# Patient Record
Sex: MALE | Race: BLACK OR AFRICAN AMERICAN | Employment: FULL TIME | URBAN - METROPOLITAN AREA
[De-identification: names, ages, dates, MRNs, and addresses within clinical notes are randomized per-mention and may not be internally consistent; named-entity substitution may affect disease eponyms.]

---

## 2020-08-05 ENCOUNTER — HOSPITAL ENCOUNTER (OUTPATIENT)
Dept: WOUND CARE | Age: 65
Discharge: HOME | End: 2020-08-05
Payer: COMMERCIAL

## 2020-08-05 VITALS
SYSTOLIC BLOOD PRESSURE: 155 MMHG | WEIGHT: 315 LBS | BODY MASS INDEX: 60.69 KG/M2 | HEART RATE: 109 BPM | TEMPERATURE: 97.5 F | DIASTOLIC BLOOD PRESSURE: 85 MMHG

## 2020-08-05 PROCEDURE — 11042 DBRDMT SUBQ TIS 1ST 20SQCM/<: CPT | Performed by: SURGERY

## 2020-08-05 PROCEDURE — 99213 OFFICE O/P EST LOW 20 MIN: CPT

## 2020-08-05 PROCEDURE — 11042 DBRDMT SUBQ TIS 1ST 20SQCM/<: CPT

## 2020-08-05 RX ORDER — LIDOCAINE HYDROCHLORIDE 40 MG/ML
SOLUTION TOPICAL ONCE
Status: CANCELLED | OUTPATIENT
Start: 2020-08-05

## 2020-08-05 RX ORDER — BETAMETHASONE DIPROPIONATE 0.05 %
OINTMENT (GRAM) TOPICAL ONCE
Status: CANCELLED | OUTPATIENT
Start: 2020-08-05

## 2020-08-05 RX ORDER — BACITRACIN, NEOMYCIN, POLYMYXIN B 400; 3.5; 5 [USP'U]/G; MG/G; [USP'U]/G
OINTMENT TOPICAL ONCE
Status: CANCELLED | OUTPATIENT
Start: 2020-08-05

## 2020-08-05 RX ORDER — CLOBETASOL PROPIONATE 0.5 MG/G
OINTMENT TOPICAL ONCE
Status: CANCELLED | OUTPATIENT
Start: 2020-08-05

## 2020-08-05 RX ORDER — GENTAMICIN SULFATE 1 MG/G
OINTMENT TOPICAL ONCE
Status: CANCELLED | OUTPATIENT
Start: 2020-08-05

## 2020-08-05 RX ORDER — LIDOCAINE 50 MG/G
OINTMENT TOPICAL ONCE
Status: CANCELLED | OUTPATIENT
Start: 2020-08-05

## 2020-08-05 RX ORDER — BACITRACIN ZINC AND POLYMYXIN B SULFATE 500; 1000 [USP'U]/G; [USP'U]/G
OINTMENT TOPICAL ONCE
Status: CANCELLED | OUTPATIENT
Start: 2020-08-05

## 2020-08-05 RX ORDER — LIDOCAINE 40 MG/G
CREAM TOPICAL ONCE
Status: DISCONTINUED | OUTPATIENT
Start: 2020-08-05 | End: 2020-08-06 | Stop reason: HOSPADM

## 2020-08-05 RX ORDER — GINSENG 100 MG
CAPSULE ORAL ONCE
Status: CANCELLED | OUTPATIENT
Start: 2020-08-05

## 2020-08-05 RX ORDER — LIDOCAINE 40 MG/G
CREAM TOPICAL ONCE
Status: CANCELLED | OUTPATIENT
Start: 2020-08-05

## 2020-08-05 NOTE — PROGRESS NOTES
[address] [facility],[address] Floor:      [address] f: [phone] f: [phone] p: [phone] [email or web address]     Ordering Center: Mert [address]  Francia Her [place] [address]  [phone]  Dept: [phone]   Fax# [phone]    Patient Information:      [address] Ximena Burnis Bare   [phone]   : 1955  AGE: 60 y.o.      GENDER: male   TODAYS DATE:  2020    Insurance:      See Attached      Patient Wound Information:     Additional ICD-10 Codes: W85.008    Patient Active Problem List   Diagnosis Code    Cellulitis of scrotum N49.2    Lymphedema of both lower extremities I89.0    Ulcer of lower limb ([address] L97.909    Hypertension I10    Obesity, morbid ([address] E66.01    Cellulitis of leg, right L03.115    Cellulitis of right foot L03.115    Ulcer of right leg ([address] L97.919    Cellulitis of right leg L03.115    Chronic venous hypertension (idiopathic) with ulcer and inflammation of right lower extremity ([address] I87.331, L97.919       WOUNDS REQUIRING DRESSING SUPPLIES:     Wound 20 Leg Right;Medial #1 (Active)   Wound Image   20 0849   Wound Venous 20 0849   Wound Cleansed Rinsed/Irrigated with saline 20 0919   Wound Length (cm) 6 cm 20 0849   Wound Width (cm) 12 cm 20 0849   Wound Depth (cm) 0.1 cm 20 0849   Wound Surface Area (cm^2) 72 cm^2 20 0849   Wound Volume (cm^3) 7.2 cm^3 20 0849   Post-Procedure Length (cm) 6 cm 20 0919   Post-Procedure Width (cm) 12 cm 20 0919   Post-Procedure Depth (cm) 0.1 cm 20 0919   Post-Procedure Surface Area (cm^2) 72 cm^2 20   Post-Procedure Volume (cm^3) 7.2 cm^3 20   Wound Assessment Bleeding 20   Drainage Amount Moderate 20   Drainage Description Yellow 20 0849   Odor None 20   Alicia-wound Assessment Edema 20

## 2020-08-05 NOTE — PROGRESS NOTES
[address] Procedure Note    Sierra Pat  AGE: 60 y.o. GENDER: male    : 1955  TODAY'S DATE: 2020    Chief Complaint   Patient presents with    Wound Check     bilateral legs returning         History of Present Illness     Sierra Pat is a 60 y.o. male who presents today for wound evaluation. History of Wound: venous wound located on the right leg. Wound Pain:  mild  Severity:  2 / 10   Wound Type:  venous  Modifying Factors:  edema and venous stasis  Associated Signs/Symptoms:  edema    Procedure Note:     Performed by: Miguel Duque MD    Consent obtained: Yes    Time out taken: Yes    Pain Control: Anesthetic  Anesthetic: 4% Lidocaine Cream     Debridement: Excisional Debridement    Using curette the wound was sharply debrided    down through and including the removal of epidermis, dermis and subcutaneous tissue. Devitalized Tissue Debrided: fibrin, biofilm and slough    Pre Debridement Measurements:  Are located in the Wound Documentation Flow Sheet     Wound #: 1     Post  Debridement Measurements:  Wound 20 Leg Right;Medial #1 (Active)   Wound Image   20 0849   Wound Venous 20   Dressing/Treatment ABD; Ace wrap;Dry dressing;Non adherent 20 0937   Wound Cleansed Rinsed/Irrigated with saline 20 09   Wound Length (cm) 6 cm 20 08   Wound Width (cm) 12 cm 20 0849   Wound Depth (cm) 0.1 cm 20 08   Wound Surface Area (cm^2) 72 cm^2 20 08   Wound Volume (cm^3) 7.2 cm^3 20 0849   Post-Procedure Length (cm) 6 cm 20 0919   Post-Procedure Width (cm) 12 cm 20 0919   Post-Procedure Depth (cm) 0.1 cm 20 0919   Post-Procedure Surface Area (cm^2) 72 cm^2 20 0919   Post-Procedure Volume (cm^3) 7.2 cm^3 20 0919   Wound Assessment Bleeding 20 09   Drainage Amount Moderate 20   Drainage Description Yellow 20 0849   Odor None 20 08 Alicia-wound Assessment Edema 08/05/20 0849   [place]%Wound Bed 50 08/05/20 0849   Red%Wound Bed 0 08/05/20 0849   Yellow%Wound Bed 0 08/05/20 0849   Black%Wound Bed 0 08/05/20 0849   Purple%Wound Bed 0 08/05/20 0849   Other%Wound Bed 50 08/05/20 0849   Number of days: 0       Wound 08/05/20 Leg Right;Posterior #2 (Active)   Wound Image   08/05/20 0853   Wound Venous 08/05/20 0853   Dressing/Treatment ABD; Ace wrap;Dry dressing;Non adherent 08/05/20 0937   Wound Cleansed Rinsed/Irrigated with saline 08/05/20 0853   Wound Length (cm) 4.8 cm 08/05/20 0853   Wound Width (cm) 4.5 cm 08/05/20 0853   Wound Depth (cm) 0.1 cm 08/05/20 0853   Wound Surface Area (cm^2) 21.6 cm^2 08/05/20 0853   Wound Volume (cm^3) 2.16 cm^3 08/05/20 0853   Wound Assessment Pink; White 08/05/20 0853   Drainage Amount Moderate 08/05/20 0853   Drainage Description Yellow 08/05/20 0853   Odor None 08/05/20 0853   Alicia-wound Assessment Edema 08/05/20 0853   [place]%Wound Bed 20 08/05/20 0853   Red%Wound Bed 0 08/05/20 0853   Yellow%Wound Bed 0 08/05/20 0853   Black%Wound Bed 0 08/05/20 0853   Purple%Wound Bed 0 08/05/20 0853   Other%Wound Bed 80 08/05/20 0853   Number of days: 0       Total Surface Area Debrided: 10% of 72 sq cm wound, 7.2 sq cm    Bleeding:  Minimal    Hemostasis Achieved:  by pressure    Procedural Pain:  1  / 10     Post Procedural Pain:  2 / 10     Response to treatment:  Well tolerated by patient. Assessment:     Right venous stasis ulcer  Left venous stasis dermatitis    Patient tolerated procedure well and was given proper instruction. The nature of the patient's condition was explained in depth. The patient was informed that their compliance to the treatment plan is paramount to successful healing and prevention of further ulceration and/or infection       Plan:     Treatment Plan: With each dressing change, rinse wounds with 0.9% Saline. (May use wound wash or soft contact solution.  Both can be purchased at a local drug store). If unable to obtain saline, may use a gentle soap and water. Dressing care: Adaptic, dry dressing, 6\" ACE (from toes to knee)- change every other day. 6\" ACE (from toes to knee) to Left lower leg    Fawad Harris MD, FACS  8/5/2020  1:47 PM

## 2020-08-05 NOTE — PLAN OF CARE
Discharge instructions given. Patient verbalized understanding. Return to [facility] in 1 week.   Called/faxed orders to [facility]  Referral to Dr Vito Bustamante

## 2020-08-12 ENCOUNTER — HOSPITAL ENCOUNTER (OUTPATIENT)
Dept: WOUND CARE | Age: 65
Discharge: HOME | End: 2020-08-12
Payer: COMMERCIAL

## 2020-08-12 PROCEDURE — 11042 DBRDMT SUBQ TIS 1ST 20SQCM/<: CPT | Performed by: SURGERY

## 2020-08-12 PROCEDURE — 11042 DBRDMT SUBQ TIS 1ST 20SQCM/<: CPT

## 2020-08-12 RX ORDER — LIDOCAINE HYDROCHLORIDE 40 MG/ML
SOLUTION TOPICAL ONCE
Status: DISCONTINUED | OUTPATIENT
Start: 2020-08-12 | End: 2020-08-13 | Stop reason: HOSPADM

## 2020-08-12 RX ORDER — LIDOCAINE 40 MG/G
CREAM TOPICAL ONCE
Status: CANCELLED | OUTPATIENT
Start: 2020-08-12

## 2020-08-12 RX ORDER — CLOBETASOL PROPIONATE 0.5 MG/G
OINTMENT TOPICAL ONCE
Status: CANCELLED | OUTPATIENT
Start: 2020-08-12

## 2020-08-12 RX ORDER — BACITRACIN ZINC AND POLYMYXIN B SULFATE 500; 1000 [USP'U]/G; [USP'U]/G
OINTMENT TOPICAL ONCE
Status: DISCONTINUED | OUTPATIENT
Start: 2020-08-12 | End: 2020-08-13 | Stop reason: HOSPADM

## 2020-08-12 RX ORDER — LIDOCAINE 50 MG/G
OINTMENT TOPICAL ONCE
Status: CANCELLED | OUTPATIENT
Start: 2020-08-12

## 2020-08-12 RX ORDER — LIDOCAINE HYDROCHLORIDE 40 MG/ML
SOLUTION TOPICAL ONCE
Status: CANCELLED | OUTPATIENT
Start: 2020-08-12

## 2020-08-12 RX ORDER — BETAMETHASONE DIPROPIONATE 0.05 %
OINTMENT (GRAM) TOPICAL ONCE
Status: CANCELLED | OUTPATIENT
Start: 2020-08-12

## 2020-08-12 RX ORDER — GINSENG 100 MG
CAPSULE ORAL ONCE
Status: CANCELLED | OUTPATIENT
Start: 2020-08-12

## 2020-08-12 RX ORDER — BACITRACIN ZINC AND POLYMYXIN B SULFATE 500; 1000 [USP'U]/G; [USP'U]/G
OINTMENT TOPICAL ONCE
Status: CANCELLED | OUTPATIENT
Start: 2020-08-12

## 2020-08-12 RX ORDER — BACITRACIN, NEOMYCIN, POLYMYXIN B 400; 3.5; 5 [USP'U]/G; MG/G; [USP'U]/G
OINTMENT TOPICAL ONCE
Status: CANCELLED | OUTPATIENT
Start: 2020-08-12

## 2020-08-12 RX ORDER — GENTAMICIN SULFATE 1 MG/G
OINTMENT TOPICAL ONCE
Status: CANCELLED | OUTPATIENT
Start: 2020-08-12

## 2020-08-12 NOTE — PLAN OF CARE
Discharge instructions given. Patient verbalized understanding. Return to [address] [place] [address],[address] in 1 week.   Pt's insurance does not cover wound care supplies

## 2020-08-12 NOTE — PROGRESS NOTES
Drainage Description Serosanguinous;Green 08/12/20 0953   Odor None 08/12/20 0953   Alicia-wound Assessment Edema 08/12/20 0953   [place]%Wound Bed 100 08/12/20 0953   Red%Wound Bed 0 08/12/20 0953   Yellow%Wound Bed 0 08/12/20 0953   Black%Wound Bed 0 08/12/20 0953   Purple%Wound Bed 0 08/12/20 0953   Other%Wound Bed 0 08/12/20 0953   Number of days: 7       Wound 08/05/20 Leg Posterior; Left #2 (Active)   Wound Image   08/05/20 0853   Wound Venous 08/12/20 0953   Dressing/Treatment ABD; Ace wrap;Dry dressing;Non adherent 08/05/20 0937   Wound Cleansed Rinsed/Irrigated with saline 08/12/20 1023   Wound Length (cm) 1.7 cm 08/12/20 0953   Wound Width (cm) 1 cm 08/12/20 0953   Wound Depth (cm) 0.1 cm 08/12/20 0953   Wound Surface Area (cm^2) 1.7 cm^2 08/12/20 0953   Change in Wound Size % (l*w) 92.13 08/12/20 0953   Wound Volume (cm^3) 0.17 cm^3 08/12/20 0953   Wound Healing % 92 08/12/20 0953   Post-Procedure Length (cm) 1.7 cm 08/12/20 1023   Post-Procedure Width (cm) 1 cm 08/12/20 1023   Post-Procedure Depth (cm) 0.1 cm 08/12/20 1023   Post-Procedure Surface Area (cm^2) 1.7 cm^2 08/12/20 1023   Post-Procedure Volume (cm^3) 0.17 cm^3 08/12/20 1023   Wound Assessment Bleeding 08/12/20 1023   Drainage Amount Moderate 08/12/20 1023   Drainage Description Sanguinous; Yellow 08/12/20 0953   Odor None 08/12/20 0953   Alicia-wound Assessment Edema;Dry 08/12/20 0953   [place]%Wound Bed 60 08/12/20 0953   Red%Wound Bed 0 08/12/20 0953   Yellow%Wound Bed 20 08/12/20 0953   Black%Wound Bed 0 08/12/20 0953   Purple%Wound Bed 0 08/12/20 0953   Other%Wound Bed 0 08/12/20 0953   Number of days: 7       Total Surface Area Debrided:  10% of wound, debrided 7.3 sq cm     Bleeding:  Minimal    Hemostasis Achieved:  by pressure    Procedural Pain:  1  / 10     Post Procedural Pain:  1 / 10     Response to treatment:  Well tolerated by patient. Assessment:     Wound looks improved.   (improved, worse or stable)    Patient tolerated procedure well and was given proper instruction. The nature of the patient's condition was explained in depth. The patient was informed that their compliance to the treatment plan is paramount to successful healing and prevention of further ulceration and/or infection       Plan:     Treatment Plan: With each dressing change, rinse wounds with 0.9% Saline. (May use wound wash or soft contact solution. Both can be purchased at a local drug store). If unable to obtain saline, may use a gentle soap and water. Dressing care: Right lower leg, Left lower leg- Antibiotic ointment, dry dressing, 6\" ACE (from toes to knee)- change every other day. William Jo[address]  Gladis Juárez MD, FACS  8/12/2020  2:13 PM

## 2020-08-19 ENCOUNTER — HOSPITAL ENCOUNTER (OUTPATIENT)
Dept: WOUND CARE | Age: 65
Discharge: HOME | End: 2020-08-19
Payer: COMMERCIAL

## 2020-08-19 PROCEDURE — 11045 DBRDMT SUBQ TISS EACH ADDL: CPT | Performed by: SURGERY

## 2020-08-19 PROCEDURE — 11042 DBRDMT SUBQ TIS 1ST 20SQCM/<: CPT | Performed by: SURGERY

## 2020-08-19 PROCEDURE — 11042 DBRDMT SUBQ TIS 1ST 20SQCM/<: CPT

## 2020-08-19 PROCEDURE — 11045 DBRDMT SUBQ TISS EACH ADDL: CPT

## 2020-08-19 RX ORDER — LIDOCAINE HYDROCHLORIDE 40 MG/ML
SOLUTION TOPICAL ONCE
Status: CANCELLED | OUTPATIENT
Start: 2020-08-19

## 2020-08-19 RX ORDER — BACITRACIN, NEOMYCIN, POLYMYXIN B 400; 3.5; 5 [USP'U]/G; MG/G; [USP'U]/G
OINTMENT TOPICAL ONCE
Status: CANCELLED | OUTPATIENT
Start: 2020-08-19

## 2020-08-19 RX ORDER — LIDOCAINE 40 MG/G
CREAM TOPICAL ONCE
Status: CANCELLED | OUTPATIENT
Start: 2020-08-19

## 2020-08-19 RX ORDER — GINSENG 100 MG
CAPSULE ORAL ONCE
Status: CANCELLED | OUTPATIENT
Start: 2020-08-19

## 2020-08-19 RX ORDER — CLOBETASOL PROPIONATE 0.5 MG/G
OINTMENT TOPICAL ONCE
Status: CANCELLED | OUTPATIENT
Start: 2020-08-19

## 2020-08-19 RX ORDER — BACITRACIN ZINC AND POLYMYXIN B SULFATE 500; 1000 [USP'U]/G; [USP'U]/G
OINTMENT TOPICAL ONCE
Status: DISCONTINUED | OUTPATIENT
Start: 2020-08-19 | End: 2020-08-20 | Stop reason: HOSPADM

## 2020-08-19 RX ORDER — BACITRACIN ZINC AND POLYMYXIN B SULFATE 500; 1000 [USP'U]/G; [USP'U]/G
OINTMENT TOPICAL ONCE
Status: CANCELLED | OUTPATIENT
Start: 2020-08-19

## 2020-08-19 RX ORDER — GENTAMICIN SULFATE 1 MG/G
OINTMENT TOPICAL ONCE
Status: CANCELLED | OUTPATIENT
Start: 2020-08-19

## 2020-08-19 RX ORDER — BETAMETHASONE DIPROPIONATE 0.05 %
OINTMENT (GRAM) TOPICAL ONCE
Status: CANCELLED | OUTPATIENT
Start: 2020-08-19

## 2020-08-19 RX ORDER — LIDOCAINE 40 MG/G
CREAM TOPICAL ONCE
Status: DISCONTINUED | OUTPATIENT
Start: 2020-08-19 | End: 2020-08-20 | Stop reason: HOSPADM

## 2020-08-19 RX ORDER — LIDOCAINE 50 MG/G
OINTMENT TOPICAL ONCE
Status: CANCELLED | OUTPATIENT
Start: 2020-08-19

## 2020-08-19 NOTE — PROGRESS NOTES
[address] Procedure Note    David Singer  AGE: 60 y.o. GENDER: male    : 1955  TODAY'S DATE: 2020    Chief Complaint   Patient presents with    Wound Check     Light leg wound/ lymphedema        History of Present Illness     David Singer is a 60 y.o. male who presents today for wound evaluation. History of Wound: venous wound located on the both leg. Wound Pain:  mild  Severity:  2 / 10   Wound Type:  venous  Modifying Factors:  edema and venous stasis  Associated Signs/Symptoms:  edema    Procedure Note:     Performed by: Rosalina Garza MD    Consent obtained: Yes    Time out taken: Yes    Pain Control: Anesthetic  Anesthetic: 4% Lidocaine Cream     Debridement: Excisional Debridement    Using curette the wound was sharply debrided    down through and including the removal of epidermis, dermis and subcutaneous tissue. Devitalized Tissue Debrided: fibrin, biofilm and slough    Pre Debridement Measurements:  Are located in the Wound Documentation Flow Sheet     Wound #: 1 and 2     Post  Debridement Measurements:  Wound 20 Leg Right;Medial #1 (Active)   Wound Image   20 0849   Wound Venous 20 1034   Dressing/Treatment ABD; Ace wrap;Dry dressing;Non adherent 20 0937   Wound Cleansed Rinsed/Irrigated with saline 20 1041   Wound Length (cm) 4 cm 20 1034   Wound Width (cm) 10 cm 20 1034   Wound Depth (cm) 0.1 cm 20 1034   Wound Surface Area (cm^2) 40 cm^2 20 1034   Change in Wound Size % (l*w) 44.44 20 1034   Wound Volume (cm^3) 4 cm^3 20 1034   Wound Healing % 44 20 1034   Post-Procedure Length (cm) 4 cm 20 1041   Post-Procedure Width (cm) 10 cm 20 1041   Post-Procedure Depth (cm) 0.1 cm 20 1041   Post-Procedure Surface Area (cm^2) 40 cm^2 20 1041   Post-Procedure Volume (cm^3) 4 cm^3 20 1041   Wound Assessment Bleeding 20 1041   Drainage Amount Moderate 08/19/20 1041   Drainage Description Serous; Yellow 08/19/20 1034   Odor None 08/19/20 1034   Alicia-wound Assessment Maceration 08/19/20 1034   [place]%Wound Bed 50 08/19/20 1034   Red%Wound Bed 0 08/19/20 1034   Yellow%Wound Bed 0 08/19/20 1034   Black%Wound Bed 0 08/19/20 1034   Purple%Wound Bed 0 08/19/20 1034   Other%Wound Bed 50 08/19/20 1034   Number of days: 14       Wound 08/05/20 Leg Posterior; Left #2 (Active)   Wound Image   08/05/20 0853   Wound Venous 08/19/20 1034   Dressing/Treatment ABD; Ace wrap;Dry dressing;Non adherent 08/05/20 0937   Wound Cleansed Rinsed/Irrigated with saline 08/19/20 1041   Wound Length (cm) 1.5 cm 08/19/20 1034   Wound Width (cm) 3 cm 08/19/20 1034   Wound Depth (cm) 0.1 cm 08/19/20 1034   Wound Surface Area (cm^2) 4.5 cm^2 08/19/20 1034   Change in Wound Size % (l*w) 79.17 08/19/20 1034   Wound Volume (cm^3) 0.45 cm^3 08/19/20 1034   Wound Healing % 79 08/19/20 1034   Post-Procedure Length (cm) 1.5 cm 08/19/20 1041   Post-Procedure Width (cm) 3 cm 08/19/20 1041   Post-Procedure Depth (cm) 0.1 cm 08/19/20 1041   Post-Procedure Surface Area (cm^2) 4.5 cm^2 08/19/20 1041   Post-Procedure Volume (cm^3) 0.45 cm^3 08/19/20 1041   Wound Assessment Bleeding 08/19/20 1041   Drainage Amount Moderate 08/19/20 1041   Drainage Description Yellow;Serous 08/19/20 1034   Odor None 08/19/20 1034   Alicia-wound Assessment Edema 08/19/20 1034   [place]%Wound Bed 60 08/19/20 1034   Red%Wound Bed 0 08/19/20 1034   Yellow%Wound Bed 0 08/19/20 1034   Black%Wound Bed 0 08/19/20 1034   Purple%Wound Bed 0 08/19/20 1034   Other%Wound Bed 40 08/19/20 1034   Number of days: 14       Total Surface Area Debrided:  44.5 sq cm     Bleeding:  Minimal    Hemostasis Achieved:  by pressure    Procedural Pain:  1  / 10     Post Procedural Pain:  2 / 10     Response to treatment:  Well tolerated by patient. Assessment:     Wound looks stable.   (improved, worse or stable)    Patient tolerated procedure well and was given proper instruction. The nature of the patient's condition was explained in depth. The patient was informed that their compliance to the treatment plan is paramount to successful healing and prevention of further ulceration and/or infection       Plan:     Treatment Plan: With each dressing change, rinse wounds with 0.9% Saline. (May use wound wash or soft contact solution. Both can be purchased at a local drug store). If unable to obtain saline, may use a gentle soap and water. Dressing care: Right lower leg, Left lower leg- Antibiotic ointment, dry dressing, 6\" ACE (from toes to knee)- change every other day. William Jovel 6  Gladis Juárez MD, FACS  8/19/2020  10:54 AM

## 2020-08-19 NOTE — PLAN OF CARE
Discharge instructions given. Patient verbalized understanding. Return to [address] [place] [address],[address] in 1 week.   Called/faxed orders to [facility] for intermittent FMLA

## 2020-08-26 ENCOUNTER — OFFICE VISIT (OUTPATIENT)
Dept: VASCULAR SURGERY | Age: 65
End: 2020-08-26
Payer: COMMERCIAL

## 2020-08-26 ENCOUNTER — HOSPITAL ENCOUNTER (OUTPATIENT)
Dept: WOUND CARE | Age: 65
Discharge: HOME | End: 2020-08-26
Payer: COMMERCIAL

## 2020-08-26 VITALS — BODY MASS INDEX: 46.65 KG/M2 | HEIGHT: 69 IN | WEIGHT: 315 LBS

## 2020-08-26 PROCEDURE — G8417 CALC BMI ABV UP PARAM F/U: HCPCS | Performed by: SURGERY

## 2020-08-26 PROCEDURE — 99203 OFFICE O/P NEW LOW 30 MIN: CPT | Performed by: SURGERY

## 2020-08-26 PROCEDURE — 11042 DBRDMT SUBQ TIS 1ST 20SQCM/<: CPT | Performed by: SURGERY

## 2020-08-26 PROCEDURE — 11042 DBRDMT SUBQ TIS 1ST 20SQCM/<: CPT

## 2020-08-26 PROCEDURE — 1036F TOBACCO NON-USER: CPT | Performed by: SURGERY

## 2020-08-26 PROCEDURE — G8427 DOCREV CUR MEDS BY ELIG CLIN: HCPCS | Performed by: SURGERY

## 2020-08-26 PROCEDURE — 3017F COLORECTAL CA SCREEN DOC REV: CPT | Performed by: SURGERY

## 2020-08-26 RX ORDER — LIDOCAINE HYDROCHLORIDE 40 MG/ML
SOLUTION TOPICAL ONCE
Status: CANCELLED | OUTPATIENT
Start: 2020-08-26

## 2020-08-26 RX ORDER — LIDOCAINE 40 MG/G
CREAM TOPICAL ONCE
Status: CANCELLED | OUTPATIENT
Start: 2020-08-26

## 2020-08-26 RX ORDER — BACITRACIN ZINC AND POLYMYXIN B SULFATE 500; 1000 [USP'U]/G; [USP'U]/G
OINTMENT TOPICAL ONCE
Status: CANCELLED | OUTPATIENT
Start: 2020-08-26

## 2020-08-26 RX ORDER — LIDOCAINE 40 MG/G
CREAM TOPICAL ONCE
Status: DISCONTINUED | OUTPATIENT
Start: 2020-08-26 | End: 2020-08-27 | Stop reason: HOSPADM

## 2020-08-26 RX ORDER — GENTAMICIN SULFATE 1 MG/G
OINTMENT TOPICAL ONCE
Status: CANCELLED | OUTPATIENT
Start: 2020-08-26

## 2020-08-26 RX ORDER — BACITRACIN, NEOMYCIN, POLYMYXIN B 400; 3.5; 5 [USP'U]/G; MG/G; [USP'U]/G
OINTMENT TOPICAL ONCE
Status: CANCELLED | OUTPATIENT
Start: 2020-08-26

## 2020-08-26 RX ORDER — LIDOCAINE 50 MG/G
OINTMENT TOPICAL ONCE
Status: CANCELLED | OUTPATIENT
Start: 2020-08-26

## 2020-08-26 RX ORDER — CLOBETASOL PROPIONATE 0.5 MG/G
OINTMENT TOPICAL ONCE
Status: CANCELLED | OUTPATIENT
Start: 2020-08-26

## 2020-08-26 RX ORDER — BETAMETHASONE DIPROPIONATE 0.05 %
OINTMENT (GRAM) TOPICAL ONCE
Status: CANCELLED | OUTPATIENT
Start: 2020-08-26

## 2020-08-26 RX ORDER — GINSENG 100 MG
CAPSULE ORAL ONCE
Status: CANCELLED | OUTPATIENT
Start: 2020-08-26

## 2020-08-26 RX ORDER — BACITRACIN ZINC AND POLYMYXIN B SULFATE 500; 1000 [USP'U]/G; [USP'U]/G
OINTMENT TOPICAL ONCE
Status: DISCONTINUED | OUTPATIENT
Start: 2020-08-26 | End: 2020-08-27 | Stop reason: HOSPADM

## 2020-08-26 NOTE — PLAN OF CARE
Discharge instructions given. Patient verbalized understanding. Return to [facility] in 1 week. Has appt to see Dr Scott Matthews today  Instructed if the pain increases to go to the ED, [facility].

## 2020-08-26 NOTE — PROGRESS NOTES
Subjective:      Patient ID: Crescencio Tee is a 60 y.o. male. HPI Referral from Enrique Boudreaux MD through the [facility] for evaluation of treatment modalities for chronic leg ulcers. Currently being treated Circaids and dressing changes with weekly [facility] visits. Has no h/o VTE. Has noted weight gain (now BMI 62) and enlargement of B thighs (R>>L) with large thigh pannuses. Past Medical History:   Diagnosis Date    Asthma     Chronic pain     GI bleed     Hypertension     Pain, knee, right      Past Surgical History:   Procedure Laterality Date    KNEE SURGERY      right knee     Allergies   Allergen Reactions    Naproxen      GI Bleed    Omeprazole      Current Outpatient Medications   Medication Sig Dispense Refill    clindamycin (CLEOCIN) 300 MG capsule Take 300 mg by mouth daily      ciprofloxacin (CIPRO) 500 MG tablet Take 500 mg by mouth 2 times daily      silver sulfADIAZINE (SILVADENE) 1 % cream Apply to skin on right leg 25 g 0    famotidine (PEPCID AC) 10 MG tablet Take 10 mg by mouth 2 times daily.  UNKNOWN TO PATIENT Uses 2 inhalers daily, doesn't know what they are.  lisinopril (PRINIVIL;ZESTRIL) 20 MG tablet Take 20 mg by mouth daily.  cetirizine-psuedoephedrine (ZYRTEC-D) 5-120 MG per tablet Take 1 tablet by mouth 2 times daily.  naproxen (EC-NAPROSYN) 500 MG EC tablet Take 1 tablet by mouth every 12 hours as needed for Pain 20 tablet 3     No current facility-administered medications for this visit.       Facility-Administered Medications Ordered in Other Visits   Medication Dose Route Frequency Provider Last Rate Last Dose    lidocaine (LMX) 4 % cream   Topical Once Jeffery Harp MD        bacitracin-polymyxin b (POLYSPORIN) ointment   Topical Once Jeffery Harp MD         Social History     Socioeconomic History    Marital status:      Spouse name: Not on file    Number of children: Not on file    Years of education: Not on file    Highest education level: Neck:      Musculoskeletal: Normal range of motion and neck supple. Cardiovascular:      Rate and Rhythm: Normal rate and regular rhythm. Pulses: Normal pulses. Heart sounds: Normal heart sounds. Pulmonary:      Effort: Pulmonary effort is normal.      Breath sounds: Normal breath sounds. Abdominal:      General: Bowel sounds are normal.      Palpations: Abdomen is soft. Musculoskeletal:      Right lower leg: Edema (general firm) present. Left lower leg: Edema (less severe compared to R) present. Comments: Large R tight pannus, less severe L thigh pannus   Skin:     General: Skin is warm and dry. Capillary Refill: Capillary refill takes less than 2 seconds. Comments: Small wounds B gaitor regions   Neurological:      Mental Status: He is alert and oriented to person, place, and time. Cranial Nerves: No cranial nerve deficit. Psychiatric:         Mood and Affect: Mood normal.         Behavior: Behavior normal.         Thought Content: Thought content normal.         Judgment: Judgment normal.         Assessment:      Chronic secondary lymphedema B legs (R>L)  Venous ulcerations B legs - improved with [facility] treatment plan. Morbid obesity      Plan:      Continued wound care per [facility]. Recommend addition of B lymphapress device for BID to control thigh process. No indication for venous indication. Should be referred to bariatric surgeon for evaluation for surgical weight loss as adjuvant to edema control. Directed pt to return to [facility] for overall management.

## 2020-09-02 ENCOUNTER — HOSPITAL ENCOUNTER (OUTPATIENT)
Dept: WOUND CARE | Age: 65
Discharge: HOME | End: 2020-09-02
Payer: COMMERCIAL

## 2020-09-02 PROCEDURE — 11042 DBRDMT SUBQ TIS 1ST 20SQCM/<: CPT | Performed by: SURGERY

## 2020-09-02 PROCEDURE — 11042 DBRDMT SUBQ TIS 1ST 20SQCM/<: CPT

## 2020-09-02 RX ORDER — CLOBETASOL PROPIONATE 0.5 MG/G
OINTMENT TOPICAL ONCE
Status: CANCELLED | OUTPATIENT
Start: 2020-09-02

## 2020-09-02 RX ORDER — BETAMETHASONE DIPROPIONATE 0.05 %
OINTMENT (GRAM) TOPICAL ONCE
Status: CANCELLED | OUTPATIENT
Start: 2020-09-02

## 2020-09-02 RX ORDER — LIDOCAINE 50 MG/G
OINTMENT TOPICAL ONCE
Status: CANCELLED | OUTPATIENT
Start: 2020-09-02

## 2020-09-02 RX ORDER — GINSENG 100 MG
CAPSULE ORAL ONCE
Status: CANCELLED | OUTPATIENT
Start: 2020-09-02

## 2020-09-02 RX ORDER — BACITRACIN, NEOMYCIN, POLYMYXIN B 400; 3.5; 5 [USP'U]/G; MG/G; [USP'U]/G
OINTMENT TOPICAL ONCE
Status: CANCELLED | OUTPATIENT
Start: 2020-09-02

## 2020-09-02 RX ORDER — LIDOCAINE HYDROCHLORIDE 40 MG/ML
SOLUTION TOPICAL ONCE
Status: CANCELLED | OUTPATIENT
Start: 2020-09-02

## 2020-09-02 RX ORDER — BACITRACIN ZINC AND POLYMYXIN B SULFATE 500; 1000 [USP'U]/G; [USP'U]/G
OINTMENT TOPICAL ONCE
Status: CANCELLED | OUTPATIENT
Start: 2020-09-02

## 2020-09-02 RX ORDER — GENTAMICIN SULFATE 1 MG/G
OINTMENT TOPICAL ONCE
Status: CANCELLED | OUTPATIENT
Start: 2020-09-02

## 2020-09-02 RX ORDER — LIDOCAINE 40 MG/G
CREAM TOPICAL ONCE
Status: CANCELLED | OUTPATIENT
Start: 2020-09-02

## 2020-09-02 RX ORDER — LIDOCAINE 40 MG/G
CREAM TOPICAL ONCE
Status: DISCONTINUED | OUTPATIENT
Start: 2020-09-02 | End: 2020-09-03 | Stop reason: HOSPADM

## 2020-09-02 RX ORDER — BACITRACIN ZINC AND POLYMYXIN B SULFATE 500; 1000 [USP'U]/G; [USP'U]/G
OINTMENT TOPICAL ONCE
Status: DISCONTINUED | OUTPATIENT
Start: 2020-09-02 | End: 2020-09-03 | Stop reason: HOSPADM

## 2020-09-02 NOTE — PROGRESS NOTES
[address] [facility],[address]:      [address] f: [phone] f: [phone] p: [phone] [email or web address]     Ordering Center: Mert [address] [place] [address]  [phone]  Dept: [phone]   Fax# [phone]    Patient Information:      [address] [place] [address] Ximena [facility]   [phone]   : 1955  AGE: 60 y.o. GENDER: male   TODAYS DATE:  2020    Insurance:      PRIMARY INSURANCE:  Plan: UNITED HE[employer] - CHOICE PLU  Coverage: UNITED HEALTHCARE  Effective Date: 2016  [id number] - (Commercial)      SECONDARY INSURANCE:  Plan:   Coverage:   Effective Date:   @[employer]@    [unfilled]   [unfilled]     Patient Wound Information:     Additional ICD-10 Codes: F37.979, L97.822, I87.2    Patient Active Problem List   Diagnosis Code    Cellulitis of scrotum N49.2    Lymphedema of both lower extremities I89.0    Ulcer of lower limb ([address] L97.909    Hypertension I10    Obesity, morbid ([address] E66.01    Cellulitis of leg, right L03.115    Cellulitis of right foot L03.115    Ulcer of right leg ([address] L97.919    Cellulitis of right leg L03.115    Chronic venous hypertension (idiopathic) with ulcer and inflammation of right lower extremity ([address] I87.331, L97.919       WOUNDS REQUIRING DRESSING SUPPLIES:     Wound 20 Leg Right;Medial #1 (Active)   Wound Image   20 0849   Wound Venous 20 1102   Dressing/Treatment ABD; Ace wrap;Dry dressing;Non adherent 20 0937   Wound Cleansed Rinsed/Irrigated with saline 20 1122   Wound Length (cm) 0.3 cm 20 1102   Wound Width (cm) 1.5 cm 20 1102   Wound Depth (cm) 0.1 cm 20 1102   Wound Surface Area (cm^2) 0.45 cm^2 20 1102   Change in Wound Size % (l*w) 99.38 20 1102   Wound Volume (cm^3) 0.04 cm^3 20 1102   Wound Healing % 99 20 1102   Post-Procedure Length (cm) 0.3 cm 09/02/20 1122   Post-Procedure Width (cm) 1.5 cm 09/02/20 1122   Post-Procedure Depth (cm) 0.1 cm 09/02/20 1122   Post-Procedure Surface Area (cm^2) 0.45 cm^2 09/02/20 1122   Post-Procedure Volume (cm^3) 0.04 cm^3 09/02/20 1122   Wound Assessment Bleeding 09/02/20 1122   Drainage Amount Moderate 09/02/20 1122   Drainage Description Serosanguinous 09/02/20 1102   Odor None 08/26/20 1002   Alicia-wound Assessment Dry;Clean 09/02/20 1102   [place]%Wound Bed 100 09/02/20 1102   Red%Wound Bed 0 09/02/20 1102   Yellow%Wound Bed 0 09/02/20 1102   Black%Wound Bed 0 09/02/20 1102   Purple%Wound Bed 0 08/26/20 1002   Other%Wound Bed 0 08/26/20 1002   Number of days: 28       Wound 08/05/20 Leg Posterior; Left #2 (Active)   Wound Image   08/05/20 0853   Wound Venous 09/02/20 1102   Dressing/Treatment ABD; Ace wrap;Dry dressing;Non adherent 08/05/20 0937   Wound Cleansed Rinsed/Irrigated with saline 09/02/20 1122   Wound Length (cm) 1 cm 09/02/20 1102   Wound Width (cm) 1 cm 09/02/20 1102   Wound Depth (cm) 0.1 cm 09/02/20 1102   Wound Surface Area (cm^2) 1 cm^2 09/02/20 1102   Change in Wound Size % (l*w) 95.37 09/02/20 1102   Wound Volume (cm^3) 0.1 cm^3 09/02/20 1102   Wound Healing % 95 09/02/20 1102   Post-Procedure Length (cm) 1 cm 09/02/20 1122   Post-Procedure Width (cm) 1 cm 09/02/20 1122   Post-Procedure Depth (cm) 0.1 cm 09/02/20 1122   Post-Procedure Surface Area (cm^2) 1 cm^2 09/02/20 1122   Post-Procedure Volume (cm^3) 0.1 cm^3 09/02/20 1122   Wound Assessment Bleeding 09/02/20 1122   Drainage Amount Moderate 09/02/20 1122   Drainage Description Yellow;Serous 09/02/20 1102   Odor None 09/02/20 1102   Alicia-wound Assessment Edema 09/02/20 1102   [place]%Wound Bed 0 09/02/20 1102   Red%Wound Bed 100 09/02/20 1102   Yellow%Wound Bed 0 09/02/20 1102   Black%Wound Bed 0 09/02/20 1102   Purple%Wound Bed 0 09/02/20 1102   Other%Wound Bed 0 08/26/20 1002   Number of days: 28          Supplies Requested :      Bilateral below knee

## 2020-09-02 NOTE — PROGRESS NOTES
Wound Assessment Bleeding 09/02/20 1122   Drainage Amount Moderate 09/02/20 1122   Drainage Description Serosanguinous 09/02/20 1102   Odor None 08/26/20 1002   Alicia-wound Assessment Dry;Clean 09/02/20 1102   [place]%Wound Bed 100 09/02/20 1102   Red%Wound Bed 0 09/02/20 1102   Yellow%Wound Bed 0 09/02/20 1102   Black%Wound Bed 0 09/02/20 1102   Purple%Wound Bed 0 08/26/20 1002   Other%Wound Bed 0 08/26/20 1002   Number of days: 28       Wound 08/05/20 Leg Posterior; Left #2 (Active)   Wound Image   08/05/20 0853   Wound Venous 09/02/20 1102   Dressing/Treatment ABD; Ace wrap;Dry dressing;Non adherent 08/05/20 0937   Wound Cleansed Rinsed/Irrigated with saline 09/02/20 1122   Wound Length (cm) 1 cm 09/02/20 1102   Wound Width (cm) 1 cm 09/02/20 1102   Wound Depth (cm) 0.1 cm 09/02/20 1102   Wound Surface Area (cm^2) 1 cm^2 09/02/20 1102   Change in Wound Size % (l*w) 95.37 09/02/20 1102   Wound Volume (cm^3) 0.1 cm^3 09/02/20 1102   Wound Healing % 95 09/02/20 1102   Post-Procedure Length (cm) 1 cm 09/02/20 1122   Post-Procedure Width (cm) 1 cm 09/02/20 1122   Post-Procedure Depth (cm) 0.1 cm 09/02/20 1122   Post-Procedure Surface Area (cm^2) 1 cm^2 09/02/20 1122   Post-Procedure Volume (cm^3) 0.1 cm^3 09/02/20 1122   Wound Assessment Bleeding 09/02/20 1122   Drainage Amount Moderate 09/02/20 1122   Drainage Description Yellow;Serous 09/02/20 1102   Odor None 09/02/20 1102   Alicia-wound Assessment Edema 09/02/20 1102   [place]%Wound Bed 0 09/02/20 1102   Red%Wound Bed 100 09/02/20 1102   Yellow%Wound Bed 0 09/02/20 1102   Black%Wound Bed 0 09/02/20 1102   Purple%Wound Bed 0 09/02/20 1102   Other%Wound Bed 0 08/26/20 1002   Number of days: 28       Total Surface Area Debrided:  1.45 sq cm     Bleeding:  Minimal    Hemostasis Achieved:  by pressure    Procedural Pain:  2  / 10     Post Procedural Pain:  2 / 10     Response to treatment:  Well tolerated by patient. Assessment:     Wound looks improved.   (improved, worse or stable)    Patient tolerated procedure well and was given proper instruction. The nature of the patient's condition was explained in depth. The patient was informed that their compliance to the treatment plan is paramount to successful healing and prevention of further ulceration and/or infection       Plan:     Treatment Plan: With each dressing change, rinse wounds with 0.9% Saline. (May use wound wash or soft contact solution. Both can be purchased at a local drug store). If unable to obtain saline, may use a gentle soap and water. Dressing care: Right lower leg, Left lower leg- Antibiotic ointment, dry dressing, 6\" ACE (from toes to knee)- change every other day. Of your pain increases you need to go to the Emergency department    [address] [facility] [address].  Hermine Scheuermann MD, FACS  9/2/2020  12:46 PM

## 2020-09-09 ENCOUNTER — HOSPITAL ENCOUNTER (OUTPATIENT)
Dept: WOUND CARE | Age: 65
Discharge: HOME | End: 2020-09-09
Payer: COMMERCIAL

## 2020-09-09 VITALS — BODY MASS INDEX: 61.73 KG/M2 | WEIGHT: 315 LBS

## 2020-09-09 PROCEDURE — 11042 DBRDMT SUBQ TIS 1ST 20SQCM/<: CPT

## 2020-09-09 PROCEDURE — 11042 DBRDMT SUBQ TIS 1ST 20SQCM/<: CPT | Performed by: SURGERY

## 2020-09-09 RX ORDER — BACITRACIN ZINC AND POLYMYXIN B SULFATE 500; 1000 [USP'U]/G; [USP'U]/G
OINTMENT TOPICAL ONCE
Status: DISCONTINUED | OUTPATIENT
Start: 2020-09-09 | End: 2020-09-10 | Stop reason: HOSPADM

## 2020-09-09 RX ORDER — LIDOCAINE 50 MG/G
OINTMENT TOPICAL ONCE
Status: CANCELLED | OUTPATIENT
Start: 2020-09-09

## 2020-09-09 RX ORDER — LIDOCAINE 40 MG/G
CREAM TOPICAL ONCE
Status: CANCELLED | OUTPATIENT
Start: 2020-09-09

## 2020-09-09 RX ORDER — BETAMETHASONE DIPROPIONATE 0.05 %
OINTMENT (GRAM) TOPICAL ONCE
Status: CANCELLED | OUTPATIENT
Start: 2020-09-09

## 2020-09-09 RX ORDER — GENTAMICIN SULFATE 1 MG/G
OINTMENT TOPICAL ONCE
Status: CANCELLED | OUTPATIENT
Start: 2020-09-09

## 2020-09-09 RX ORDER — BACITRACIN, NEOMYCIN, POLYMYXIN B 400; 3.5; 5 [USP'U]/G; MG/G; [USP'U]/G
OINTMENT TOPICAL ONCE
Status: CANCELLED | OUTPATIENT
Start: 2020-09-09

## 2020-09-09 RX ORDER — BACITRACIN ZINC AND POLYMYXIN B SULFATE 500; 1000 [USP'U]/G; [USP'U]/G
OINTMENT TOPICAL ONCE
Status: CANCELLED | OUTPATIENT
Start: 2020-09-09

## 2020-09-09 RX ORDER — LIDOCAINE HYDROCHLORIDE 40 MG/ML
SOLUTION TOPICAL ONCE
Status: CANCELLED | OUTPATIENT
Start: 2020-09-09

## 2020-09-09 RX ORDER — CLOBETASOL PROPIONATE 0.5 MG/G
OINTMENT TOPICAL ONCE
Status: CANCELLED | OUTPATIENT
Start: 2020-09-09

## 2020-09-09 RX ORDER — GINSENG 100 MG
CAPSULE ORAL ONCE
Status: CANCELLED | OUTPATIENT
Start: 2020-09-09

## 2020-09-09 RX ORDER — LIDOCAINE 40 MG/G
CREAM TOPICAL ONCE
Status: DISCONTINUED | OUTPATIENT
Start: 2020-09-09 | End: 2020-09-10 | Stop reason: HOSPADM

## 2020-09-09 ASSESSMENT — PAIN DESCRIPTION - PROGRESSION: CLINICAL_PROGRESSION: NOT CHANGED

## 2020-09-09 ASSESSMENT — PAIN DESCRIPTION - FREQUENCY: FREQUENCY: CONTINUOUS

## 2020-09-09 ASSESSMENT — PAIN DESCRIPTION - ONSET: ONSET: ON-GOING

## 2020-09-09 ASSESSMENT — PAIN DESCRIPTION - ORIENTATION: ORIENTATION: LEFT

## 2020-09-09 ASSESSMENT — PAIN DESCRIPTION - DESCRIPTORS: DESCRIPTORS: ACHING

## 2020-09-09 ASSESSMENT — PAIN DESCRIPTION - LOCATION: LOCATION: LEG

## 2020-09-09 ASSESSMENT — PAIN SCALES - GENERAL: PAINLEVEL_OUTOF10: 10

## 2020-09-09 ASSESSMENT — PAIN DESCRIPTION - PAIN TYPE: TYPE: CHRONIC PAIN

## 2020-09-09 NOTE — PROGRESS NOTES
[address] Procedure Note    Bakari Burrell  AGE: 60 y.o. GENDER: male    : 1955  TODAY'S DATE: 2020    Chief Complaint   Patient presents with    Wound Check     Bilateral legs F/U        History of Present Illness     Bakari Burrell is a 60 y.o. male who presents today for wound evaluation. History of Wound: venous and lymphedema wound located on the both legs  Wound Pain:  mild  Severity:  2 / 10   Wound Type:  venous and lymphedema  Modifying Factors:  edema, venous stasis, lymphedema, decreased mobility and obesity  Associated Signs/Symptoms:  edema and drainage    Procedure Note:     Performed by: Nubia Bailey MD    Consent obtained: Yes    Time out taken: Yes    Pain Control: Anesthetic  Anesthetic: 4% Lidocaine Cream     Debridement: Excisional Debridement    Using curette the wound was sharply debrided    down through and including the removal of epidermis, dermis and subcutaneous tissue. Devitalized Tissue Debrided: fibrin, biofilm and slough    Pre Debridement Measurements:  Are located in the Wound Documentation Flow Sheet     Wound #: 1 and 2     Post  Debridement Measurements:  Wound 20 Leg Right;Medial #1 (Active)   Wound Image   20 0849   Wound Venous 20 1108   Dressing/Treatment ABD; Ace wrap;Dry dressing;Non adherent 20 0937   Wound Cleansed Rinsed/Irrigated with saline 20 1125   Wound Length (cm) 5 cm 20 1108   Wound Width (cm) 10.5 cm 20 1108   Wound Depth (cm) 0.1 cm 20 1108   Wound Surface Area (cm^2) 52.5 cm^2 20 1108   Change in Wound Size % (l*w) 27.08 20 1108   Wound Volume (cm^3) 5.25 cm^3 20 1108   Wound Healing % 27 20 1108   Post-Procedure Length (cm) 5 cm 20 1125   Post-Procedure Width (cm) 10.5 cm 20 1125   Post-Procedure Depth (cm) 0.1 cm 20 1125   Post-Procedure Surface Area (cm^2) 52.5 cm^2 20 1125   Post-Procedure Volume (cm^3) 5.25 cm^3 09/09/20 1125   Wound Assessment Bleeding 09/09/20 1125   Drainage Amount Moderate 09/09/20 1125   Drainage Description Yellow 09/09/20 1108   Odor None 09/09/20 1108   Alicia-wound Assessment Edema; Excoriated 09/09/20 1108   [place]%Wound Bed 50 09/09/20 1108   Red%Wound Bed 0 09/09/20 1108   Yellow%Wound Bed 0 09/09/20 1108   Black%Wound Bed 0 09/09/20 1108   Purple%Wound Bed 0 09/09/20 1108   Other%Wound Bed 50 09/09/20 1108   Number of days: 35       Wound 08/05/20 Leg Posterior; Left #2 (Active)   Wound Image   08/05/20 0853   Wound Venous 09/09/20 1108   Dressing/Treatment ABD; Ace wrap;Dry dressing;Non adherent 08/05/20 0937   Wound Cleansed Rinsed/Irrigated with saline 09/09/20 1125   Wound Length (cm) 1.4 cm 09/09/20 1108   Wound Width (cm) 3 cm 09/09/20 1108   Wound Depth (cm) 0.1 cm 09/09/20 1108   Wound Surface Area (cm^2) 4.2 cm^2 09/09/20 1108   Change in Wound Size % (l*w) 80.56 09/09/20 1108   Wound Volume (cm^3) 0.42 cm^3 09/09/20 1108   Wound Healing % 81 09/09/20 1108   Post-Procedure Length (cm) 1.4 cm 09/09/20 1125   Post-Procedure Width (cm) 3 cm 09/09/20 1125   Post-Procedure Depth (cm) 0.1 cm 09/09/20 1125   Post-Procedure Surface Area (cm^2) 4.2 cm^2 09/09/20 1125   Post-Procedure Volume (cm^3) 0.42 cm^3 09/09/20 1125   Wound Assessment Bleeding 09/09/20 1125   Drainage Amount Moderate 09/09/20 1125   Drainage Description Yellow 09/09/20 1108   Odor None 09/02/20 1102   Alicia-wound Assessment Edema; Excoriated 09/09/20 1108   [place]%Wound Bed 100 09/09/20 1108   Red%Wound Bed 0 09/09/20 1108   Yellow%Wound Bed 0 09/09/20 1108   Black%Wound Bed 0 09/09/20 1108   Purple%Wound Bed 0 09/09/20 1108   Other%Wound Bed 0 09/09/20 1108   Number of days: 35       Total Surface Area Debrided:  (10% of wound 1), 14.7 sq cm     Bleeding:  Minimal    Hemostasis Achieved:  by pressure    Procedural Pain:  1  / 10     Post Procedural Pain:  1 / 10     Response to treatment:  Well tolerated by patient. Assessment:     Wound looks improved. (improved, worse or stable)    Patient tolerated procedure well and was given proper instruction. The nature of the patient's condition was explained in depth. The patient was informed that their compliance to the treatment plan is paramount to successful healing and prevention of further ulceration and/or infection       Plan:     Treatment Plan: With each dressing change, rinse wounds with 0.9% Saline. (May use wound wash or soft contact solution. Both can be purchased at a local drug store). If unable to obtain saline, may use a gentle soap and water. Dressing care: Right lower leg, Left lower leg- Antibiotic ointment, dry dressing, compression stockings or a 6\" ACE (from toes to knee)- change every other day. Of your pain increases you need to go to the Emergency department    [address] [place] [address] [address].  Jay Medina MD, FACS  9/9/2020  1:45 PM

## 2020-09-09 NOTE — PLAN OF CARE
Discharge instructions given. Patient verbalized understanding. Return to [address] [place] [address],[address] in 1 week.

## 2020-09-16 ENCOUNTER — HOSPITAL ENCOUNTER (OUTPATIENT)
Dept: WOUND CARE | Age: 65
Discharge: HOME | End: 2020-09-16
Payer: COMMERCIAL

## 2020-09-16 VITALS — BODY MASS INDEX: 61.73 KG/M2 | WEIGHT: 315 LBS

## 2020-09-16 PROCEDURE — 11042 DBRDMT SUBQ TIS 1ST 20SQCM/<: CPT

## 2020-09-16 PROCEDURE — 11042 DBRDMT SUBQ TIS 1ST 20SQCM/<: CPT | Performed by: SURGERY

## 2020-09-16 RX ORDER — BACITRACIN ZINC AND POLYMYXIN B SULFATE 500; 1000 [USP'U]/G; [USP'U]/G
OINTMENT TOPICAL ONCE
Status: CANCELLED | OUTPATIENT
Start: 2020-09-16

## 2020-09-16 RX ORDER — GENTAMICIN SULFATE 1 MG/G
OINTMENT TOPICAL ONCE
Status: CANCELLED | OUTPATIENT
Start: 2020-09-16

## 2020-09-16 RX ORDER — CLOBETASOL PROPIONATE 0.5 MG/G
OINTMENT TOPICAL ONCE
Status: CANCELLED | OUTPATIENT
Start: 2020-09-16

## 2020-09-16 RX ORDER — GINSENG 100 MG
CAPSULE ORAL ONCE
Status: CANCELLED | OUTPATIENT
Start: 2020-09-16

## 2020-09-16 RX ORDER — BETAMETHASONE DIPROPIONATE 0.05 %
OINTMENT (GRAM) TOPICAL ONCE
Status: CANCELLED | OUTPATIENT
Start: 2020-09-16

## 2020-09-16 RX ORDER — LIDOCAINE 40 MG/G
CREAM TOPICAL ONCE
Status: CANCELLED | OUTPATIENT
Start: 2020-09-16

## 2020-09-16 RX ORDER — BACITRACIN, NEOMYCIN, POLYMYXIN B 400; 3.5; 5 [USP'U]/G; MG/G; [USP'U]/G
OINTMENT TOPICAL ONCE
Status: CANCELLED | OUTPATIENT
Start: 2020-09-16

## 2020-09-16 RX ORDER — LIDOCAINE HYDROCHLORIDE 40 MG/ML
SOLUTION TOPICAL ONCE
Status: CANCELLED | OUTPATIENT
Start: 2020-09-16

## 2020-09-16 RX ORDER — LIDOCAINE 40 MG/G
CREAM TOPICAL ONCE
Status: DISCONTINUED | OUTPATIENT
Start: 2020-09-16 | End: 2020-09-17 | Stop reason: HOSPADM

## 2020-09-16 RX ORDER — LIDOCAINE 50 MG/G
OINTMENT TOPICAL ONCE
Status: CANCELLED | OUTPATIENT
Start: 2020-09-16

## 2020-09-16 RX ORDER — BACITRACIN ZINC AND POLYMYXIN B SULFATE 500; 1000 [USP'U]/G; [USP'U]/G
OINTMENT TOPICAL ONCE
Status: DISCONTINUED | OUTPATIENT
Start: 2020-09-16 | End: 2020-09-17 | Stop reason: HOSPADM

## 2020-09-16 ASSESSMENT — PAIN DESCRIPTION - FREQUENCY: FREQUENCY: CONTINUOUS

## 2020-09-16 ASSESSMENT — PAIN DESCRIPTION - PROGRESSION: CLINICAL_PROGRESSION: NOT CHANGED

## 2020-09-16 ASSESSMENT — PAIN SCALES - GENERAL: PAINLEVEL_OUTOF10: 10

## 2020-09-16 ASSESSMENT — PAIN DESCRIPTION - ORIENTATION: ORIENTATION: LEFT;RIGHT

## 2020-09-16 ASSESSMENT — PAIN DESCRIPTION - LOCATION: LOCATION: LEG

## 2020-09-16 ASSESSMENT — PAIN DESCRIPTION - ONSET: ONSET: ON-GOING

## 2020-09-16 ASSESSMENT — PAIN DESCRIPTION - DESCRIPTORS: DESCRIPTORS: ACHING

## 2020-09-16 ASSESSMENT — PAIN DESCRIPTION - PAIN TYPE: TYPE: CHRONIC PAIN

## 2020-09-16 NOTE — PLAN OF CARE
Discharge instructions given. Patient verbalized understanding. Return to [facility] in 1 week.

## 2020-09-23 ENCOUNTER — HOSPITAL ENCOUNTER (OUTPATIENT)
Dept: WOUND CARE | Age: 65
Discharge: HOME | End: 2020-09-23
Payer: COMMERCIAL

## 2020-09-30 ENCOUNTER — HOSPITAL ENCOUNTER (OUTPATIENT)
Dept: WOUND CARE | Age: 65
Discharge: HOME | End: 2020-09-30
Payer: COMMERCIAL

## 2020-09-30 PROCEDURE — 99212 OFFICE O/P EST SF 10 MIN: CPT | Performed by: SURGERY

## 2020-09-30 PROCEDURE — 99212 OFFICE O/P EST SF 10 MIN: CPT

## 2020-09-30 NOTE — PLAN OF CARE
Discharge instructions given. Patient verbalized understanding.   Return to [address] [place] [address],[address] as needed  To get Lymphedema pumps through [facility]- approved by insurance

## 2020-09-30 NOTE — PROGRESS NOTES
[address] Progress Note    Brodie Prather  AGE: 60 y.o. GENDER: male    : 1955  TODAY'S DATE: 2020    Subjective:     Chief Complaint   Patient presents with    Wound Check     bilateral lower leg         History of Present Illness     Brodie Prather presents today for wound evaluation. History of Wound: 30-year-old male presents with lymphedema of both legs. Wounds have healed. No new complaints    Wound Type:  venous and lymphedema  Wound Location: both sides  leg  Wound Pain:  none  Severity:  1 / 10   Timing: resolved  Modifying Factors:  edema, venous stasis, lymphedema, decreased mobility and obesity  Associated Signs/Symptoms:  edema  Other significant symptoms or pertinent wound history: none. Past Medical History:   Diagnosis Date    Asthma     Chronic pain     GI bleed     Hypertension     Pain, knee, right        Past Surgical History:   Procedure Laterality Date    KNEE SURGERY      right knee       Current Outpatient Medications   Medication Sig Dispense Refill    naproxen (EC-NAPROSYN) 500 MG EC tablet Take 1 tablet by mouth every 12 hours as needed for Pain 20 tablet 3    clindamycin (CLEOCIN) 300 MG capsule Take 300 mg by mouth daily      ciprofloxacin (CIPRO) 500 MG tablet Take 500 mg by mouth 2 times daily      silver sulfADIAZINE (SILVADENE) 1 % cream Apply to skin on right leg 25 g 0    famotidine (PEPCID AC) 10 MG tablet Take 10 mg by mouth 2 times daily.  UNKNOWN TO PATIENT Uses 2 inhalers daily, doesn't know what they are.  lisinopril (PRINIVIL;ZESTRIL) 20 MG tablet Take 20 mg by mouth daily.  cetirizine-psuedoephedrine (ZYRTEC-D) 5-120 MG per tablet Take 1 tablet by mouth 2 times daily. No current facility-administered medications for this encounter.          Allergies   Allergen Reactions    Naproxen      GI Bleed    Omeprazole              REVIEW OF SYSTEMS    Pertinent items from the review of systems are discussed in the HPI; the remainder of the ROS was reviewed and is negative. Objective: There were no vitals taken for this visit. PHYSICAL EXAM    General Appearance: alert and oriented to person, place and time, well developed and well- nourished, in no acute distress  Skin: warm and dry, no rash or erythema  Head: normocephalic and atraumatic  Both legs with healed wounds, no new wounds or cellulitis      Assessment:     Patient Active Problem List   Diagnosis    Cellulitis of scrotum    Lymphedema of both lower extremities    Ulcer of lower limb ([address]    Hypertension    Obesity, morbid ([address]    Cellulitis of leg, right    Cellulitis of right foot    Ulcer of right leg (HCC)    Cellulitis of right leg    Chronic venous hypertension (idiopathic) with ulcer and inflammation of right lower extremity ([address]       Wound 08/05/20 Leg Right;Medial #1 (Active)   Wound Image   08/05/20 0849   Wound Venous 09/30/20 1035   Dressing/Treatment ABD; Ace wrap;Dry dressing;Non adherent 08/05/20 0937   Wound Cleansed Rinsed/Irrigated with saline 09/30/20 1035   Wound Length (cm) 0 cm 09/30/20 1035   Wound Width (cm) 0 cm 09/30/20 1035   Wound Depth (cm) 0.1 cm 09/30/20 1035   Wound Surface Area (cm^2) 0 cm^2 09/30/20 1035   Change in Wound Size % (l*w) 100 09/30/20 1035   Wound Volume (cm^3) 0 cm^3 09/30/20 1035   Wound Healing % 100 09/30/20 1035   Post-Procedure Length (cm) 1 cm 09/16/20 1135   Post-Procedure Width (cm) 0.3 cm 09/16/20 1135   Post-Procedure Depth (cm) 0.1 cm 09/16/20 1135   Post-Procedure Surface Area (cm^2) 0.3 cm^2 09/16/20 1135   Post-Procedure Volume (cm^3) 0.03 cm^3 09/16/20 1135   Wound Assessment Pink 09/30/20 1035   Drainage Amount Moderate 09/16/20 1135   Drainage Description Yellow 09/16/20 1118   Odor None 09/16/20 1118   Alicia-wound Assessment Edema; Excoriated 09/16/20 1118   [place]%Wound Bed 100 09/16/20 1118   Red%Wound Bed 0 09/16/20 1118   Yellow%Wound Bed 0 09/16/20 1118 Black%Wound Bed 0 09/16/20 1118   Purple%Wound Bed 0 09/16/20 1118   Other%Wound Bed 0 09/16/20 1118   Number of days: 56       Wound 08/05/20 Leg Posterior; Left #2 (Active)   Wound Image   08/05/20 0853   Wound Venous 09/30/20 1035   Dressing/Treatment ABD; Ace wrap;Dry dressing;Non adherent 08/05/20 0937   Wound Cleansed Rinsed/Irrigated with saline 09/30/20 1035   Wound Length (cm) 0 cm 09/30/20 1035   Wound Width (cm) 0 cm 09/30/20 1035   Wound Depth (cm) 0.1 cm 09/30/20 1035   Wound Surface Area (cm^2) 0 cm^2 09/30/20 1035   Change in Wound Size % (l*w) 100 09/30/20 1035   Wound Volume (cm^3) 0 cm^3 09/30/20 1035   Wound Healing % 100 09/30/20 1035   Post-Procedure Length (cm) 0.3 cm 09/16/20 1135   Post-Procedure Width (cm) 0.7 cm 09/16/20 1135   Post-Procedure Depth (cm) 0.1 cm 09/16/20 1135   Post-Procedure Surface Area (cm^2) 0.21 cm^2 09/16/20 1135   Post-Procedure Volume (cm^3) 0.02 cm^3 09/16/20 1135   Wound Assessment Bleeding 09/16/20 1135   Drainage Amount Moderate 09/16/20 1135   Drainage Description Yellow 09/16/20 1118   Odor None 09/16/20 1118   Alicia-wound Assessment Dry;Edema; Excoriated 09/16/20 1118   [place]%Wound Bed 0 09/16/20 1118   Red%Wound Bed 0 09/16/20 1118   Yellow%Wound Bed 100 09/16/20 1118   Black%Wound Bed 0 09/16/20 1118   Purple%Wound Bed 0 09/16/20 1118   Other%Wound Bed 0 09/16/20 1118   Number of days: 56         Plan:     The nature of the patient's condition was explained in depth. The patient was informed that their compliance to the treatment plan is paramount to successful healing and prevention of further ulceration and/or infection     Discharge Treatment       Wounds have healed, no additional wound care necessary  Continue pumps for lymphedema  Continue follow-up with bariatrics for evaluation  Follow with wound clinic as needed    Written Patient Discharge Instructions Given         Macie Renee MD, FACS  9/30/2020  10:50 AM

## 2021-05-14 ENCOUNTER — APPOINTMENT (OUTPATIENT)
Dept: GENERAL RADIOLOGY | Age: 66
DRG: 638 | End: 2021-05-14
Payer: COMMERCIAL

## 2021-05-14 ENCOUNTER — HOSPITAL ENCOUNTER (INPATIENT)
Age: 66
LOS: 3 days | Discharge: HOME | DRG: 638 | End: 2021-05-17
Attending: EMERGENCY MEDICINE | Admitting: INTERNAL MEDICINE
Payer: COMMERCIAL

## 2021-05-14 DIAGNOSIS — E13.10 DIABETIC KETOACIDOSIS WITHOUT COMA ASSOCIATED WITH OTHER SPECIFIED DIABETES MELLITUS (HCC): Primary | ICD-10-CM

## 2021-05-14 PROBLEM — E11.10 UNCONTROLLED TYPE 2 DIABETES MELLITUS WITH KETOACIDOSIS WITHOUT COMA (HCC): Status: ACTIVE | Noted: 2021-05-14

## 2021-05-14 LAB
A/G RATIO: 1.1 (ref 1.1–2.2)
ALBUMIN SERPL-MCNC: 3.9 G/DL (ref 3.4–5)
ALP BLD-CCNC: 99 U/L (ref 40–129)
ALT SERPL-CCNC: 29 U/L (ref 10–40)
ANION GAP SERPL CALCULATED.3IONS-SCNC: 24 MMOL/L (ref 3–16)
AST SERPL-CCNC: 14 U/L (ref 15–37)
BASE EXCESS VENOUS: -5.2 MMOL/L (ref -3–3)
BASOPHILS ABSOLUTE: 0.1 K/UL (ref 0–0.2)
BASOPHILS RELATIVE PERCENT: 0.7 %
BETA-HYDROXYBUTYRATE: 3.7 MMOL/L (ref 0–0.27)
BILIRUB SERPL-MCNC: 0.3 MG/DL (ref 0–1)
BUN BLDV-MCNC: 83 MG/DL (ref 7–20)
CALCIUM SERPL-MCNC: 9.8 MG/DL (ref 8.3–10.6)
CARBOXYHEMOGLOBIN: 3.1 % (ref 0–1.5)
CHLORIDE BLD-SCNC: 93 MMOL/L (ref 99–110)
CO2: 19 MMOL/L (ref 21–32)
CREAT SERPL-MCNC: 3.8 MG/DL (ref 0.8–1.3)
EOSINOPHILS ABSOLUTE: 0 K/UL (ref 0–0.6)
EOSINOPHILS RELATIVE PERCENT: 0.2 %
GFR AFRICAN AMERICAN: 20
GFR NON-AFRICAN AMERICAN: 16
GLOBULIN: 3.7 G/DL
GLUCOSE BLD-MCNC: 1027 MG/DL (ref 70–99)
GLUCOSE BLD-MCNC: >600 MG/DL (ref 70–99)
HCO3 VENOUS: 20.1 MMOL/L (ref 23–29)
HCT VFR BLD CALC: 48.2 % (ref 40.5–52.5)
HEMOGLOBIN, VEN, REDUCED: 5 %
HEMOGLOBIN: 15.2 G/DL (ref 13.5–17.5)
LACTIC ACID, SEPSIS: 2.2 MMOL/L (ref 0.4–1.9)
LIPASE: 74 U/L (ref 13–60)
LYMPHOCYTES ABSOLUTE: 0.8 K/UL (ref 1–5.1)
LYMPHOCYTES RELATIVE PERCENT: 11.2 %
MCH RBC QN AUTO: 30.7 PG (ref 26–34)
MCHC RBC AUTO-ENTMCNC: 31.5 G/DL (ref 31–36)
MCV RBC AUTO: 97.5 FL (ref 80–100)
METHEMOGLOBIN VENOUS: 0.7 %
MONOCYTES ABSOLUTE: 0.9 K/UL (ref 0–1.3)
MONOCYTES RELATIVE PERCENT: 12.6 %
NEUTROPHILS ABSOLUTE: 5.5 K/UL (ref 1.7–7.7)
NEUTROPHILS RELATIVE PERCENT: 75.3 %
O2 CONTENT, VEN: 20 VOL %
O2 SAT, VEN: 95 %
O2 THERAPY: ABNORMAL
PCO2, VEN: 37.5 MMHG (ref 40–50)
PDW BLD-RTO: 15.1 % (ref 12.4–15.4)
PERFORMED ON: ABNORMAL
PH VENOUS: 7.34 (ref 7.35–7.45)
PLATELET # BLD: 250 K/UL (ref 135–450)
PMV BLD AUTO: 11.5 FL (ref 5–10.5)
PO2, VEN: 76 MMHG (ref 25–40)
POTASSIUM REFLEX MAGNESIUM: 4.7 MMOL/L (ref 3.5–5.1)
RBC # BLD: 4.95 M/UL (ref 4.2–5.9)
SODIUM BLD-SCNC: 136 MMOL/L (ref 136–145)
TCO2 CALC VENOUS: 48 MMOL/L
TOTAL PROTEIN: 7.6 G/DL (ref 6.4–8.2)
TROPONIN: 0.01 NG/ML
WBC # BLD: 7.4 K/UL (ref 4–11)

## 2021-05-14 PROCEDURE — 83690 ASSAY OF LIPASE: CPT

## 2021-05-14 PROCEDURE — 71045 X-RAY EXAM CHEST 1 VIEW: CPT

## 2021-05-14 PROCEDURE — 80053 COMPREHEN METABOLIC PANEL: CPT

## 2021-05-14 PROCEDURE — 2000000000 HC ICU R&B

## 2021-05-14 PROCEDURE — 6370000000 HC RX 637 (ALT 250 FOR IP): Performed by: INTERNAL MEDICINE

## 2021-05-14 PROCEDURE — 6360000002 HC RX W HCPCS: Performed by: INTERNAL MEDICINE

## 2021-05-14 PROCEDURE — 99284 EMERGENCY DEPT VISIT MOD MDM: CPT

## 2021-05-14 PROCEDURE — 87077 CULTURE AEROBIC IDENTIFY: CPT

## 2021-05-14 PROCEDURE — 87086 URINE CULTURE/COLONY COUNT: CPT

## 2021-05-14 PROCEDURE — 2580000003 HC RX 258: Performed by: EMERGENCY MEDICINE

## 2021-05-14 PROCEDURE — 96360 HYDRATION IV INFUSION INIT: CPT

## 2021-05-14 PROCEDURE — 84484 ASSAY OF TROPONIN QUANT: CPT

## 2021-05-14 PROCEDURE — 85025 COMPLETE CBC W/AUTO DIFF WBC: CPT

## 2021-05-14 PROCEDURE — 81001 URINALYSIS AUTO W/SCOPE: CPT

## 2021-05-14 PROCEDURE — 87186 SC STD MICRODIL/AGAR DIL: CPT

## 2021-05-14 PROCEDURE — 2580000003 HC RX 258: Performed by: INTERNAL MEDICINE

## 2021-05-14 PROCEDURE — 82010 KETONE BODYS QUAN: CPT

## 2021-05-14 PROCEDURE — 83605 ASSAY OF LACTIC ACID: CPT

## 2021-05-14 PROCEDURE — 82803 BLOOD GASES ANY COMBINATION: CPT

## 2021-05-14 PROCEDURE — 93005 ELECTROCARDIOGRAM TRACING: CPT | Performed by: EMERGENCY MEDICINE

## 2021-05-14 RX ORDER — SODIUM CHLORIDE 9 MG/ML
INJECTION, SOLUTION INTRAVENOUS CONTINUOUS
Status: DISCONTINUED | OUTPATIENT
Start: 2021-05-15 | End: 2021-05-15

## 2021-05-14 RX ORDER — SODIUM CHLORIDE, SODIUM LACTATE, POTASSIUM CHLORIDE, CALCIUM CHLORIDE 600; 310; 30; 20 MG/100ML; MG/100ML; MG/100ML; MG/100ML
INJECTION, SOLUTION INTRAVENOUS ONCE
Status: DISCONTINUED | OUTPATIENT
Start: 2021-05-14 | End: 2021-05-14

## 2021-05-14 RX ORDER — 0.9 % SODIUM CHLORIDE 0.9 %
1000 INTRAVENOUS SOLUTION INTRAVENOUS ONCE
Status: COMPLETED | OUTPATIENT
Start: 2021-05-14 | End: 2021-05-14

## 2021-05-14 RX ORDER — MAGNESIUM SULFATE 1 G/100ML
1000 INJECTION INTRAVENOUS PRN
Status: DISCONTINUED | OUTPATIENT
Start: 2021-05-14 | End: 2021-05-17 | Stop reason: HOSPADM

## 2021-05-14 RX ORDER — NICOTINE POLACRILEX 4 MG
15 LOZENGE BUCCAL PRN
Status: DISCONTINUED | OUTPATIENT
Start: 2021-05-14 | End: 2021-05-14

## 2021-05-14 RX ORDER — HEPARIN SODIUM 5000 [USP'U]/ML
5000 INJECTION, SOLUTION INTRAVENOUS; SUBCUTANEOUS EVERY 8 HOURS SCHEDULED
Status: DISCONTINUED | OUTPATIENT
Start: 2021-05-14 | End: 2021-05-17 | Stop reason: HOSPADM

## 2021-05-14 RX ORDER — POTASSIUM CHLORIDE 7.45 MG/ML
10 INJECTION INTRAVENOUS PRN
Status: DISCONTINUED | OUTPATIENT
Start: 2021-05-14 | End: 2021-05-15

## 2021-05-14 RX ORDER — DEXTROSE MONOHYDRATE 25 G/50ML
12.5 INJECTION, SOLUTION INTRAVENOUS PRN
Status: DISCONTINUED | OUTPATIENT
Start: 2021-05-14 | End: 2021-05-17 | Stop reason: HOSPADM

## 2021-05-14 RX ORDER — DEXTROSE AND SODIUM CHLORIDE 5; .45 G/100ML; G/100ML
INJECTION, SOLUTION INTRAVENOUS CONTINUOUS PRN
Status: DISCONTINUED | OUTPATIENT
Start: 2021-05-14 | End: 2021-05-15

## 2021-05-14 RX ORDER — 0.9 % SODIUM CHLORIDE 0.9 %
15 INTRAVENOUS SOLUTION INTRAVENOUS ONCE
Status: COMPLETED | OUTPATIENT
Start: 2021-05-14 | End: 2021-05-15

## 2021-05-14 RX ORDER — DEXTROSE MONOHYDRATE 25 G/50ML
12.5 INJECTION, SOLUTION INTRAVENOUS PRN
Status: DISCONTINUED | OUTPATIENT
Start: 2021-05-14 | End: 2021-05-14

## 2021-05-14 RX ORDER — DEXTROSE MONOHYDRATE 50 MG/ML
100 INJECTION, SOLUTION INTRAVENOUS PRN
Status: DISCONTINUED | OUTPATIENT
Start: 2021-05-14 | End: 2021-05-14

## 2021-05-14 RX ADMIN — HEPARIN SODIUM 5000 UNITS: 5000 INJECTION INTRAVENOUS; SUBCUTANEOUS at 23:47

## 2021-05-14 RX ADMIN — SODIUM CHLORIDE 1000 ML: 9 INJECTION, SOLUTION INTRAVENOUS at 21:57

## 2021-05-14 RX ADMIN — SODIUM CHLORIDE 2348 ML: 9 INJECTION, SOLUTION INTRAVENOUS at 23:42

## 2021-05-14 RX ADMIN — SODIUM CHLORIDE 0.1 UNITS/KG/HR: 9 INJECTION, SOLUTION INTRAVENOUS at 23:46

## 2021-05-14 ASSESSMENT — PAIN SCALES - GENERAL: PAINLEVEL_OUTOF10: 10

## 2021-05-15 LAB
ANION GAP SERPL CALCULATED.3IONS-SCNC: 11 MMOL/L (ref 3–16)
ANION GAP SERPL CALCULATED.3IONS-SCNC: 14 MMOL/L (ref 3–16)
ANION GAP SERPL CALCULATED.3IONS-SCNC: 25 MMOL/L (ref 3–16)
ANION GAP SERPL CALCULATED.3IONS-SCNC: 9 MMOL/L (ref 3–16)
BACTERIA: ABNORMAL /HPF
BASOPHILS ABSOLUTE: 0 K/UL (ref 0–0.2)
BASOPHILS RELATIVE PERCENT: 0.6 %
BILIRUBIN URINE: NEGATIVE
BLOOD, URINE: ABNORMAL
BUN BLDV-MCNC: 59 MG/DL (ref 7–20)
BUN BLDV-MCNC: 64 MG/DL (ref 7–20)
BUN BLDV-MCNC: 70 MG/DL (ref 7–20)
BUN BLDV-MCNC: 81 MG/DL (ref 7–20)
CALCIUM SERPL-MCNC: 8.9 MG/DL (ref 8.3–10.6)
CALCIUM SERPL-MCNC: 9.2 MG/DL (ref 8.3–10.6)
CALCIUM SERPL-MCNC: 9.5 MG/DL (ref 8.3–10.6)
CALCIUM SERPL-MCNC: 9.7 MG/DL (ref 8.3–10.6)
CHLORIDE BLD-SCNC: 111 MMOL/L (ref 99–110)
CHLORIDE BLD-SCNC: 114 MMOL/L (ref 99–110)
CHLORIDE BLD-SCNC: 120 MMOL/L (ref 99–110)
CHLORIDE BLD-SCNC: 99 MMOL/L (ref 99–110)
CLARITY: ABNORMAL
CO2: 16 MMOL/L (ref 21–32)
CO2: 21 MMOL/L (ref 21–32)
CO2: 22 MMOL/L (ref 21–32)
CO2: 22 MMOL/L (ref 21–32)
COLOR: YELLOW
COMMENT UA: ABNORMAL
CREAT SERPL-MCNC: 2.1 MG/DL (ref 0.8–1.3)
CREAT SERPL-MCNC: 2.3 MG/DL (ref 0.8–1.3)
CREAT SERPL-MCNC: 2.8 MG/DL (ref 0.8–1.3)
CREAT SERPL-MCNC: 3.5 MG/DL (ref 0.8–1.3)
EKG ATRIAL RATE: 94 BPM
EKG DIAGNOSIS: NORMAL
EKG P AXIS: 55 DEGREES
EKG P-R INTERVAL: 160 MS
EKG Q-T INTERVAL: 400 MS
EKG QRS DURATION: 88 MS
EKG QTC CALCULATION (BAZETT): 500 MS
EKG R AXIS: 53 DEGREES
EKG T AXIS: 45 DEGREES
EKG VENTRICULAR RATE: 94 BPM
EOSINOPHILS ABSOLUTE: 0.1 K/UL (ref 0–0.6)
EOSINOPHILS RELATIVE PERCENT: 1.7 %
EPITHELIAL CELLS, UA: ABNORMAL /HPF (ref 0–5)
GFR AFRICAN AMERICAN: 21
GFR AFRICAN AMERICAN: 28
GFR AFRICAN AMERICAN: 35
GFR AFRICAN AMERICAN: 39
GFR NON-AFRICAN AMERICAN: 18
GFR NON-AFRICAN AMERICAN: 23
GFR NON-AFRICAN AMERICAN: 29
GFR NON-AFRICAN AMERICAN: 32
GLUCOSE BLD-MCNC: 164 MG/DL (ref 70–99)
GLUCOSE BLD-MCNC: 165 MG/DL (ref 70–99)
GLUCOSE BLD-MCNC: 178 MG/DL (ref 70–99)
GLUCOSE BLD-MCNC: 185 MG/DL (ref 70–99)
GLUCOSE BLD-MCNC: 190 MG/DL (ref 70–99)
GLUCOSE BLD-MCNC: 197 MG/DL (ref 70–99)
GLUCOSE BLD-MCNC: 222 MG/DL (ref 70–99)
GLUCOSE BLD-MCNC: 258 MG/DL (ref 70–99)
GLUCOSE BLD-MCNC: 345 MG/DL (ref 70–99)
GLUCOSE BLD-MCNC: 408 MG/DL (ref 70–99)
GLUCOSE BLD-MCNC: 414 MG/DL (ref 70–99)
GLUCOSE BLD-MCNC: 417 MG/DL (ref 70–99)
GLUCOSE BLD-MCNC: 445 MG/DL (ref 70–99)
GLUCOSE BLD-MCNC: 456 MG/DL (ref 70–99)
GLUCOSE BLD-MCNC: 459 MG/DL (ref 70–99)
GLUCOSE BLD-MCNC: 466 MG/DL (ref 70–99)
GLUCOSE BLD-MCNC: 510 MG/DL (ref 70–99)
GLUCOSE BLD-MCNC: 610 MG/DL (ref 70–99)
GLUCOSE BLD-MCNC: 900 MG/DL (ref 70–99)
GLUCOSE URINE: >=1000 MG/DL
HCT VFR BLD CALC: 43.7 % (ref 40.5–52.5)
HEMOGLOBIN: 14.4 G/DL (ref 13.5–17.5)
KETONES, URINE: 15 MG/DL
LACTIC ACID: 1.3 MMOL/L (ref 0.4–2)
LEUKOCYTE ESTERASE, URINE: ABNORMAL
LYMPHOCYTES ABSOLUTE: 1.1 K/UL (ref 1–5.1)
LYMPHOCYTES RELATIVE PERCENT: 13.3 %
MAGNESIUM: 3.3 MG/DL (ref 1.8–2.4)
MAGNESIUM: 3.4 MG/DL (ref 1.8–2.4)
MAGNESIUM: 3.4 MG/DL (ref 1.8–2.4)
MAGNESIUM: 3.5 MG/DL (ref 1.8–2.4)
MCH RBC QN AUTO: 30.8 PG (ref 26–34)
MCHC RBC AUTO-ENTMCNC: 33.1 G/DL (ref 31–36)
MCV RBC AUTO: 93.1 FL (ref 80–100)
MICROSCOPIC EXAMINATION: YES
MONOCYTES ABSOLUTE: 1.2 K/UL (ref 0–1.3)
MONOCYTES RELATIVE PERCENT: 14.4 %
NEUTROPHILS ABSOLUTE: 5.8 K/UL (ref 1.7–7.7)
NEUTROPHILS RELATIVE PERCENT: 70 %
NITRITE, URINE: POSITIVE
PDW BLD-RTO: 14.4 % (ref 12.4–15.4)
PERFORMED ON: ABNORMAL
PH UA: 5 (ref 5–8)
PHOSPHORUS: 1.7 MG/DL (ref 2.5–4.9)
PHOSPHORUS: 2.4 MG/DL (ref 2.5–4.9)
PHOSPHORUS: 2.5 MG/DL (ref 2.5–4.9)
PHOSPHORUS: 4.2 MG/DL (ref 2.5–4.9)
PLATELET # BLD: 234 K/UL (ref 135–450)
PMV BLD AUTO: 10.8 FL (ref 5–10.5)
POTASSIUM SERPL-SCNC: 4.1 MMOL/L (ref 3.5–5.1)
POTASSIUM SERPL-SCNC: 4.1 MMOL/L (ref 3.5–5.1)
POTASSIUM SERPL-SCNC: 4.3 MMOL/L (ref 3.5–5.1)
POTASSIUM SERPL-SCNC: 4.8 MMOL/L (ref 3.5–5.1)
PROTEIN UA: ABNORMAL MG/DL
RBC # BLD: 4.69 M/UL (ref 4.2–5.9)
RBC UA: ABNORMAL /HPF (ref 0–4)
SODIUM BLD-SCNC: 140 MMOL/L (ref 136–145)
SODIUM BLD-SCNC: 145 MMOL/L (ref 136–145)
SODIUM BLD-SCNC: 147 MMOL/L (ref 136–145)
SODIUM BLD-SCNC: 152 MMOL/L (ref 136–145)
SPECIFIC GRAVITY UA: 1.03 (ref 1–1.03)
URINE REFLEX TO CULTURE: YES
URINE TYPE: ABNORMAL
UROBILINOGEN, URINE: 0.2 E.U./DL
WBC # BLD: 8.2 K/UL (ref 4–11)
WBC UA: ABNORMAL /HPF (ref 0–5)

## 2021-05-15 PROCEDURE — 93010 ELECTROCARDIOGRAM REPORT: CPT | Performed by: INTERNAL MEDICINE

## 2021-05-15 PROCEDURE — 6370000000 HC RX 637 (ALT 250 FOR IP): Performed by: INTERNAL MEDICINE

## 2021-05-15 PROCEDURE — 83605 ASSAY OF LACTIC ACID: CPT

## 2021-05-15 PROCEDURE — 85025 COMPLETE CBC W/AUTO DIFF WBC: CPT

## 2021-05-15 PROCEDURE — 82947 ASSAY GLUCOSE BLOOD QUANT: CPT

## 2021-05-15 PROCEDURE — 2000000000 HC ICU R&B

## 2021-05-15 PROCEDURE — 2500000003 HC RX 250 WO HCPCS: Performed by: INTERNAL MEDICINE

## 2021-05-15 PROCEDURE — 83735 ASSAY OF MAGNESIUM: CPT

## 2021-05-15 PROCEDURE — 6360000002 HC RX W HCPCS: Performed by: INTERNAL MEDICINE

## 2021-05-15 PROCEDURE — 84100 ASSAY OF PHOSPHORUS: CPT

## 2021-05-15 PROCEDURE — 36415 COLL VENOUS BLD VENIPUNCTURE: CPT

## 2021-05-15 PROCEDURE — 2580000003 HC RX 258: Performed by: INTERNAL MEDICINE

## 2021-05-15 PROCEDURE — 80048 BASIC METABOLIC PNL TOTAL CA: CPT

## 2021-05-15 RX ORDER — LIDOCAINE HYDROCHLORIDE 10 MG/ML
5 INJECTION, SOLUTION EPIDURAL; INFILTRATION; INTRACAUDAL; PERINEURAL ONCE
Status: DISCONTINUED | OUTPATIENT
Start: 2021-05-15 | End: 2021-05-17 | Stop reason: HOSPADM

## 2021-05-15 RX ORDER — INSULIN LISPRO 100 [IU]/ML
0-18 INJECTION, SOLUTION INTRAVENOUS; SUBCUTANEOUS EVERY 4 HOURS
Status: DISCONTINUED | OUTPATIENT
Start: 2021-05-15 | End: 2021-05-17 | Stop reason: HOSPADM

## 2021-05-15 RX ORDER — SODIUM CHLORIDE 9 MG/ML
25 INJECTION, SOLUTION INTRAVENOUS PRN
Status: DISCONTINUED | OUTPATIENT
Start: 2021-05-15 | End: 2021-05-15

## 2021-05-15 RX ORDER — SODIUM CHLORIDE 0.9 % (FLUSH) 0.9 %
5-40 SYRINGE (ML) INJECTION EVERY 12 HOURS SCHEDULED
Status: DISCONTINUED | OUTPATIENT
Start: 2021-05-15 | End: 2021-05-17 | Stop reason: HOSPADM

## 2021-05-15 RX ORDER — NICOTINE POLACRILEX 4 MG
15 LOZENGE BUCCAL PRN
Status: DISCONTINUED | OUTPATIENT
Start: 2021-05-15 | End: 2021-05-17 | Stop reason: HOSPADM

## 2021-05-15 RX ORDER — SODIUM CHLORIDE 450 MG/100ML
INJECTION, SOLUTION INTRAVENOUS CONTINUOUS
Status: DISCONTINUED | OUTPATIENT
Start: 2021-05-15 | End: 2021-05-17

## 2021-05-15 RX ORDER — MECLIZINE HCL 12.5 MG/1
12.5 TABLET ORAL 3 TIMES DAILY PRN
Status: DISCONTINUED | OUTPATIENT
Start: 2021-05-15 | End: 2021-05-17 | Stop reason: HOSPADM

## 2021-05-15 RX ORDER — DEXTROSE MONOHYDRATE 25 G/50ML
12.5 INJECTION, SOLUTION INTRAVENOUS PRN
Status: DISCONTINUED | OUTPATIENT
Start: 2021-05-15 | End: 2021-05-17 | Stop reason: HOSPADM

## 2021-05-15 RX ORDER — DEXTROSE MONOHYDRATE 50 MG/ML
100 INJECTION, SOLUTION INTRAVENOUS PRN
Status: DISCONTINUED | OUTPATIENT
Start: 2021-05-15 | End: 2021-05-17 | Stop reason: HOSPADM

## 2021-05-15 RX ORDER — SODIUM CHLORIDE 0.9 % (FLUSH) 0.9 %
5-40 SYRINGE (ML) INJECTION PRN
Status: DISCONTINUED | OUTPATIENT
Start: 2021-05-15 | End: 2021-05-17 | Stop reason: HOSPADM

## 2021-05-15 RX ORDER — FAMOTIDINE 20 MG/1
10 TABLET, FILM COATED ORAL 2 TIMES DAILY
Status: DISCONTINUED | OUTPATIENT
Start: 2021-05-15 | End: 2021-05-17 | Stop reason: HOSPADM

## 2021-05-15 RX ADMIN — HEPARIN SODIUM 5000 UNITS: 5000 INJECTION INTRAVENOUS; SUBCUTANEOUS at 22:13

## 2021-05-15 RX ADMIN — Medication 10 MEQ: at 10:00

## 2021-05-15 RX ADMIN — SODIUM CHLORIDE 1 ML: 4.5 INJECTION, SOLUTION INTRAVENOUS at 11:30

## 2021-05-15 RX ADMIN — Medication 10 MEQ: at 14:00

## 2021-05-15 RX ADMIN — SODIUM CHLORIDE: 9 INJECTION, SOLUTION INTRAVENOUS at 09:12

## 2021-05-15 RX ADMIN — Medication 10 MEQ: at 02:45

## 2021-05-15 RX ADMIN — SODIUM CHLORIDE: 9 INJECTION, SOLUTION INTRAVENOUS at 01:00

## 2021-05-15 RX ADMIN — Medication 10 MEQ: at 06:41

## 2021-05-15 RX ADMIN — Medication 10 MEQ: at 07:53

## 2021-05-15 RX ADMIN — INSULIN GLARGINE 30 UNITS: 100 INJECTION, SOLUTION SUBCUTANEOUS at 16:01

## 2021-05-15 RX ADMIN — FAMOTIDINE 10 MG: 20 TABLET ORAL at 20:11

## 2021-05-15 RX ADMIN — HEPARIN SODIUM 5000 UNITS: 5000 INJECTION INTRAVENOUS; SUBCUTANEOUS at 05:44

## 2021-05-15 RX ADMIN — FAMOTIDINE 10 MG: 20 TABLET ORAL at 08:47

## 2021-05-15 RX ADMIN — SODIUM PHOSPHATE, MONOBASIC, MONOHYDRATE 10 MMOL: 276; 142 INJECTION, SOLUTION INTRAVENOUS at 06:00

## 2021-05-15 RX ADMIN — Medication 10 MEQ: at 05:39

## 2021-05-15 RX ADMIN — Medication 10 MEQ: at 03:50

## 2021-05-15 RX ADMIN — DEXTROSE AND SODIUM CHLORIDE: 5; 450 INJECTION, SOLUTION INTRAVENOUS at 12:07

## 2021-05-15 RX ADMIN — SODIUM CHLORIDE 1.95 UNITS/HR: 9 INJECTION, SOLUTION INTRAVENOUS at 04:50

## 2021-05-15 RX ADMIN — SODIUM CHLORIDE: 9 INJECTION, SOLUTION INTRAVENOUS at 05:09

## 2021-05-15 RX ADMIN — INSULIN LISPRO 9 UNITS: 100 INJECTION, SOLUTION INTRAVENOUS; SUBCUTANEOUS at 20:06

## 2021-05-15 RX ADMIN — HEPARIN SODIUM 5000 UNITS: 5000 INJECTION INTRAVENOUS; SUBCUTANEOUS at 14:58

## 2021-05-15 ASSESSMENT — PAIN SCALES - GENERAL
PAINLEVEL_OUTOF10: 0

## 2021-05-15 NOTE — ED NOTES
Report called to ICU, RN verbalized understanding and denied any need for further information, patient placed on Lifepak and transported to unit, insulin and normal saline infusing at time of transport     Saloni Gilmore RN  05/15/21 0020

## 2021-05-15 NOTE — PROGRESS NOTES
Pt arrived to ICU, drowsy but A/O x4 answering questions appropriately. Insulin gtt and NS bolus infusing on arrival. Pt on room air. 1.5L NC placed as spo2 dropping to 90% while asleep. Lungs clear, respirations unlabored. Ab soft, rounded, nontender. + bowel sounds. Skin warm and dry. BLE swollen, red/[place] and flaky, chronic per pt. Peripheral pulses palpable. Voided 800 ml per urinal.   Orders released to review  Pt states new diabetic with in past 3 months diagnosed @ PCP and not knowing how to operate home glucometer. Security took pt's 2 wallets per pt request. Receipt on chart.

## 2021-05-15 NOTE — H&P
Hospital Medicine History and Physical    5/14/2021    Date of Admission: 5/14/2021    Date of Service: Pt seen/examined on 5/14/2021 and admitted to inpatient/ICU. Assessment/plan:  1. Uncontrolled diabetes type 2 with DKA, without coma. Likely secondary to medication non-use in recently diagnosed diabetes. Checking hemoglobin A1c. Give fluid bolus, followed by maintenance infusion. Start insulin infusion. Admit to ICU for close monitoring. Monitor Accu-Chek closely, adjust insulin dose as needed. Monitor serial chemistry studies and replace electrolytes as needed. Consult has been initiated to diabetes educator. 2. Acute kidney injury. Likely prerenal azotemia from hyperglycemia/severe dehydration. Avoid/hold nephrotoxic medications. Continue intravenous fluid. Monitor renal function closely. 3. Lactic acidosis. Likely secondary to anion gap metabolic acidosis in the setting of DKA. So far, no evidence of infectious process. Continue intravenous fluid repletion, trend lactic acid. 4. Prolonged QTc noted on EKG. Likely secondary to underlying electrolyte abnormalities. Avoid QTprolonging medications. Repeat EKG in the morning. 5. Other comorbidities: history of asthma, GI bleed, essential hypertension, osteoarthritis, chronic pain, morbid obesity with BMI of 50.95 kg/m². Activities: Up with assist  Prophylaxis: Subcutaneous heparin  Code status: Full code    ==========================================================  Chief complaint:  Chief Complaint   Patient presents with    Hyperglycemia     Pt. arrived via EMS with c/o fatigue x3 days. Pt. is a diabetic EMS reports sugar at 474. Pt. reports sugar has not been checked in 2 months D/T unable to use glucometer properly. History of Presenting Illness:   This is a pleasant 63 y.o. male with history of asthma, GI bleed, essential hypertension, osteoarthritis, chronic pain, morbid obesity with BMI of 50.95 kg/m², who was recently diagnosed with diabetes type 2 about 2 months ago, has not been taking his medications because he does not know how to do Accu-Chek. He presents to the emergency room with complaints of polydipsia, polyuria, generalized weakness. He does not have cough or chest pain or shortness of breath or fever or chills. On evaluation in the emergency room, he was noted to have blood glucose of 1027. He also has elevated BUN of 83, creatinine 3.8 (baseline normal creatinine). Lactic acid is slightly elevated at 2.2. Past Medical History:      Diagnosis Date    Asthma     Chronic pain     GI bleed     Hypertension     Pain, knee, right        Past Surgical History:      Procedure Laterality Date    KNEE SURGERY      right knee       Medications (prior to admission):  Prior to Admission medications    Medication Sig Start Date End Date Taking? Authorizing Provider   naproxen (EC-NAPROSYN) 500 MG EC tablet Take 1 tablet by mouth every 12 hours as needed for Pain 6/15/16 5/14/21 Yes Urbano Alvarez MD   silver sulfADIAZINE (SILVADENE) 1 % cream Apply to skin on right leg 5/22/16  Yes Kang Mancilla MD   famotidine (PEPCID AC) 10 MG tablet Take 10 mg by mouth 2 times daily. Yes Historical Provider, MD   lisinopril (PRINIVIL;ZESTRIL) 20 MG tablet Take 20 mg by mouth daily. Yes Historical Provider, MD   cetirizine-psuedoephedrine (ZYRTEC-D) 5-120 MG per tablet Take 1 tablet by mouth 2 times daily. Yes Historical Provider, MD   UNKNOWN TO PATIENT Uses 2 inhalers daily, doesn't know what they are. Historical Provider, MD       Allergy(ies):  Naproxen and Omeprazole    Social History:  TOBACCO:  reports that he quit smoking about 11 years ago. He has never used smokeless tobacco.  ETOH:  reports current alcohol use.     Family History:      Problem Relation Age of Onset    Asthma Mother     High Cholesterol Mother     High Blood Pressure Father     High Cholesterol Father        Review of Systems: Pertinent positives are listed in HPI. At least 10-point ROS reviewed and were negative. Vitals and physical examination:  BP (!) 112/59   Pulse 96   Temp 98.1 °F (36.7 °C) (Oral)   Resp 18   Ht 5' 9\" (1.753 m)   Wt (!) 345 lb (156.5 kg)   SpO2 93%   BMI 50.95 kg/m²   Gen/overall appearance: Not in acute distress. Alert. Oriented x3. Head: Normocephalic, atraumatic  Eyes: EOMI, good acuity  ENT: Oral mucosa dry  Neck: No JVD, thyromegaly  CVS: Nml S1S2, no MRG, RRR  Pulm: Clear bilaterally. No crackles/wheezes  Gastrointestinal: Soft, NT/ND, +BS  Musculoskeletal: Bilateral lower extremity venous stasis dermatitis. No edema. Warm  Neuro: No focal deficit. Moves extremity spontaneously. Psychiatry: Appropriate affect. Not agitated. Skin: Warm, dry with normal turgor. No rash  Capillary refill: Brisk,< 3 seconds   Peripheral Pulses: +2 palpable, equal bilaterally       Labs/imaging/EKG:  CBC:   Recent Labs     05/14/21 2201   WBC 7.4   HGB 15.2        BMP:    Recent Labs     05/14/21 2201      K 4.7   CL 93*   CO2 19*   BUN 83*   CREATININE 3.8*   GLUCOSE 1,027*     Hepatic:   Recent Labs     05/14/21 2201   AST 14*   ALT 29   BILITOT 0.3   ALKPHOS 99       Xr Chest Portable    Result Date: 5/14/2021  EXAMINATION: ONE XRAY VIEW OF THE CHEST 5/14/2021 8:43 pm COMPARISON: January 28, 2011. HISTORY: ORDERING SYSTEM PROVIDED HISTORY: fatigue TECHNOLOGIST PROVIDED HISTORY: Reason for exam:->fatigue FINDINGS: A portable upright frontal view chest radiograph was obtained. The heart is enlarged. The mediastinal contour and pleural spaces are otherwise within normal limits. The lungs are grossly clear. There is no focal consolidation or pneumothorax. The pulmonary vascular pattern is within normal limits. No acute thoracic osseous abnormality. Clear lungs. Cardiomegaly. No acute cardiopulmonary abnormality. EKG: Normal sinus rhythm, rate 95 beats per minutes. No acute ST/T changes. QTc 500. I reviewed EKG. Discussed with ER provider.       Thank you Jessie Woodard for the opportunity to be involved in this patient's care.    -----------------------------  Ramona James MD  [facility]

## 2021-05-15 NOTE — PROGRESS NOTES
Hospitalist Progress Note      PCP: Jazmin Gomez    Date of Admission: 5/14/2021    Chief Complaint: DKA    Hospital Course: This is a pleasant 63 y.o. male with history of asthma, GI bleed, essential hypertension, osteoarthritis, chronic pain, morbid obesity with BMI of 50.95 kg/m², who was recently diagnosed with diabetes type 2 about 2 months ago, has not been taking his medications presents to the emergency room with complaints of polydipsia, polyuria, generalized weakness. On evaluation in the emergency room, he was noted to have blood glucose of 1027. He also has elevated BUN of 83, creatinine 3.8 (baseline normal creatinine). Lactic acid at 2.2.       Subjective: Pt seen and examined at bedside. Reports chronic generalized body aches. No acute events. On insulin drip and iv fluids. NPO. Medications:  Reviewed    Infusion Medications    sodium chloride      sodium chloride      dextrose 5 % and 0.45 % NaCl      insulin 23.2 Units/kg/hr (05/15/21 1057)     Scheduled Medications    famotidine  10 mg Oral BID    lidocaine 1 % injection  5 mL Intradermal Once    sodium chloride flush  5-40 mL Intravenous 2 times per day    heparin (porcine)  5,000 Units Subcutaneous 3 times per day     PRN Meds: meclizine, sodium chloride flush, sodium chloride, dextrose 5 % and 0.45 % NaCl, dextrose, potassium chloride, magnesium sulfate, sodium phosphate IVPB **OR** sodium phosphate IVPB **OR** sodium phosphate IVPB      Intake/Output Summary (Last 24 hours) at 5/15/2021 1130  Last data filed at 5/15/2021 0540  Gross per 24 hour   Intake 3620.51 ml   Output 1350 ml   Net 2270.51 ml       Physical Exam Performed:    BP (!) 117/99   Pulse 91   Temp 98.1 °F (36.7 °C) (Temporal)   Resp 16   Ht 5' 9\" (1.753 m)   Wt (!) 327 lb 3.2 oz (148.4 kg)   SpO2 95%   BMI 48.32 kg/m²     General appearance: No apparent distress, appears stated age and cooperative. Respiratory:  Normal respiratory effort.  Clear to auscultation, bilaterally without Rales/Wheezes/Rhonchi. Cardiovascular: Regular rate and rhythm with normal S1/S2 without murmurs, rubs or gallops. Abdomen: Soft, non-tender, non-distended with normal bowel sounds. Musculoskeletal: No clubbing, cyanosis or edema bilaterally. Full range of motion without deformity. Skin: Skin color, texture, turgor normal.  No rashes or lesions. Neurologic:  Neurovascularly intact without any focal sensory/motor deficits. Cranial nerves: II-XII intact, grossly non-focal.  Psychiatric: Alert and oriented, thought content appropriate, normal insight  Capillary Refill: Brisk,3 seconds, normal   Peripheral Pulses: +2 palpable, equal bilaterally       Labs:   Recent Labs     05/14/21  2201 05/15/21  0459   WBC 7.4 8.2   HGB 15.2 14.4   HCT 48.2 43.7    234     Recent Labs     05/15/21  0112 05/15/21  0459 05/15/21  0928    147* 145   K 4.8 4.3 4.1   CL 99 111* 114*   CO2 16* 22 22   BUN 81* 70* 64*   CREATININE 3.5* 2.8* 2.3*   CALCIUM 9.7 9.2 8.9   PHOS 4.2 2.4* 2.5     Recent Labs     05/14/21  2201   AST 14*   ALT 29   BILITOT 0.3   ALKPHOS 99     No results for input(s): INR in the last 72 hours. Recent Labs     05/14/21  2201   TROPONINI 0.01       Urinalysis:      Lab Results   Component Value Date    NITRU NEGATIVE 08/30/2010    WBCUA Rare 08/30/2010    BACTERIA 1+ 08/30/2010    RBCUA 3-5 08/30/2010    BLOODU TRACE 08/30/2010    SPECGRAV 1.020 08/30/2010    GLUCOSEU NEGATIVE 08/30/2010       Radiology:  XR CHEST PORTABLE   Final Result   Clear lungs. Cardiomegaly. No acute cardiopulmonary abnormality. Assessment/Plan:    Active Hospital Problems    Diagnosis     Uncontrolled type 2 diabetes mellitus with ketoacidosis without coma ([facility] [address] [E11.10]      Uncontrolled diabetes type 2 with DKA, without coma. Likely secondary to medication non-use in recently diagnosed diabetes. Added HbA1c with am labs. Iv fluids. Insulin drip. NPO. When blood sugars trend down, and AG closes plan to switch to basal insulin. Consult has been initiated to diabetes educator. Acute kidney injury. Likely prerenal azotemia from hyperglycemia/severe dehydration. Improving renal paramters. AG improving towards normal.   Avoid/hold nephrotoxic medications. Continue intravenous fluid. Monitor renal function closely. Lactic acidosis. Likely in the setting of DKA. So far, no evidence of infectious process. Continue intravenous fluid repletion, trend lactic acid. Prolonged QTc noted on EKG. Likely secondary to underlying electrolyte abnormalities. Avoid QTprolonging medications.       Other comorbidities: history of asthma, GI bleed, essential hypertension, osteoarthritis, chronic pain, morbid obesity with BMI of 50.95 kg/m².     Activities: Up with assist  Prophylaxis: Subcutaneous heparin  Diet: Diet NPO Effective Now  Code Status: Full Code    PT/OT Eval Status:pending  Dispo - d/c in 1-2 days    Robbi Baumgarten, MD

## 2021-05-15 NOTE — FLOWSHEET NOTE
05/15/21 1500   Provider Notification   Reason for Communication Review case   Provider Name Nereyda   Provider Notification Physician   Method of Communication Secure Message   Response Waiting for response   Notification Time 1500     \"FYI gap closed and BS running in mid-high 100's. Do you want to continue with insulin gtt?  \"    See new orders

## 2021-05-15 NOTE — ED PROVIDER NOTES
[address] [facility] PROVIDER NOTE    Patient Identification  Pt Name: Yris Winkler  MRN: [id number]  Tasha 1955  Date of evaluation: 5/14/2021  Provider: Abhinav Moses MD  PCP: Madeline Lau    Chief Complaint  Hyperglycemia (Pt. arrived via EMS with c/o fatigue x3 days. Pt. is a diabetic EMS reports sugar at 474. Pt. reports sugar has not been checked in 2 months D/T unable to use glucometer properly.  )      HPI  History provided by patient   This is a 63 y.o. male who presents to the ED for generalized fatigue for the past 3 days. Per family, he has been more groggy and not himself. The patient states that he is having jitteriness/shakiness in his arms. Has occasional blurred vision. Denies any pain. Specifically no headache, chest pain, abdominal pain, back pain, neck pain, arm pain. No shortness of breath. No recent fever or illness. Has not checked his blood sugar in the past 2 months. Is taking his oral diabetes medications but does not know the name of them. Has polyuria and polydipsia. Recently stressed by the passing of his brother. ROS  10 systems reviewed, pertinent positives/negatives per HPI otherwise noted to be negative. I have reviewed the following nursing documentation:  Allergies: Naproxen and Omeprazole    Past medical history:   Past Medical History:   Diagnosis Date    Asthma     Chronic pain     GI bleed     Hypertension     Pain, knee, right      Past surgical history:   Past Surgical History:   Procedure Laterality Date    KNEE SURGERY      right knee       Home medications:   Previous Medications    CETIRIZINE-PSUEDOEPHEDRINE (ZYRTEC-D) 5-120 MG PER TABLET    Take 1 tablet by mouth 2 times daily. CIPROFLOXACIN (CIPRO) 500 MG TABLET    Take 500 mg by mouth 2 times daily    CLINDAMYCIN (CLEOCIN) 300 MG CAPSULE    Take 300 mg by mouth daily    FAMOTIDINE (PEPCID AC) 10 MG TABLET    Take 10 mg by mouth 2 times daily.     LISINOPRIL - 5.90 M/uL    Hemoglobin 15.2 13.5 - 17.5 g/dL    Hematocrit 48.2 40.5 - 52.5 %    MCV 97.5 80.0 - 100.0 fL    MCH 30.7 26.0 - 34.0 pg    MCHC 31.5 31.0 - 36.0 g/dL    RDW 15.1 12.4 - 15.4 %    Platelets 997 683 - 994 K/uL    MPV 11.5 (H) 5.0 - 10.5 fL    Neutrophils % 75.3 %    Lymphocytes % 11.2 %    Monocytes % 12.6 %    Eosinophils % 0.2 %    Basophils % 0.7 %    Neutrophils Absolute 5.5 1.7 - 7.7 K/uL    Lymphocytes Absolute 0.8 (L) 1.0 - 5.1 K/uL    Monocytes Absolute 0.9 0.0 - 1.3 K/uL    Eosinophils Absolute 0.0 0.0 - 0.6 K/uL    Basophils Absolute 0.1 0.0 - 0.2 K/uL   Comprehensive Metabolic Panel w/ Reflex to MG   Result Value Ref Range    Sodium 136 136 - 145 mmol/L    Potassium reflex Magnesium 4.7 3.5 - 5.1 mmol/L    Chloride 93 (L) 99 - 110 mmol/L    CO2 19 (L) 21 - 32 mmol/L    Anion Gap 24 (H) 3 - 16    Glucose 1,027 (HH) 70 - 99 mg/dL    BUN 83 (HH) 7 - 20 mg/dL    CREATININE 3.8 (H) 0.8 - 1.3 mg/dL    GFR Non- 16 (A) >60    GFR  20 (A) >60    Calcium 9.8 8.3 - 10.6 mg/dL    Total Protein 7.6 6.4 - 8.2 g/dL    Albumin 3.9 3.4 - 5.0 g/dL    Albumin/Globulin Ratio 1.1 1.1 - 2.2    Total Bilirubin 0.3 0.0 - 1.0 mg/dL    Alkaline Phosphatase 99 40 - 129 U/L    ALT 29 10 - 40 U/L    AST 14 (L) 15 - 37 U/L    Globulin 3.7 g/dL   Blood Gas, Venous   Result Value Ref Range    pH, [place] 7.337 (L) 7.350 - 7.450    pCO2, [place] 37.5 (L) 40.0 - 50.0 mmHg    pO2, [place] 76.0 (H) 25 - 40 mmHg    HCO3, Venous 20.1 (L) 23.0 - 29.0 mmol/L    Base Excess, [place] -5.2 (L) -3.0 - 3.0 mmol/L    O2 Sat, [place] 95 Not Established %    Carboxyhemoglobin 3.1 (H) 0.0 - 1.5 %    MetHgb, [place] 0.7 <1.5 %    TC02 (Calc), [place] 48 Not Established mmol/L    O2 Content, [place] 20 Not Established VOL %    Hemoglobin, [place], Reduced 5 %   Lipase   Result Value Ref Range    Lipase 74.0 (H) 13.0 - 60.0 U/L   Troponin   Result Value Ref Range    Troponin 0.01 <0.01 ng/mL   Beta-Hydroxybutyrate   Result Value Ref Range Beta-Hydroxybutyrate 3.70 (H) 0.00 - 0.27 mmol/L   Lactate, Sepsis   Result Value Ref Range    Lactic Acid, Sepsis 2.2 (H) 0.4 - 1.9 mmol/L   POCT Glucose   Result Value Ref Range    POC Glucose >600 (AA) 70 - 99 mg/dl    Performed on ACCU-CHEK    EKG 12 Lead   Result Value Ref Range    Ventricular Rate 94 BPM    Atrial Rate 94 BPM    P-R Interval 160 ms    QRS Duration 88 ms    Q-T Interval 400 ms    QTc Calculation (Bazett) 500 ms    P Axis 55 degrees    R Axis 53 degrees    T Axis 45 degrees    Diagnosis Normal sinus rhythmProlonged QT        Screenings           MDM and ED Course  This is a 63 y.o. male who presents to the ED for polyuria, polydipsia, fatigue, hyperglycemia. May be in DKA. No clear inciting factor but does not check blood sugars at home. Giving IV fluids. No signs of infection at this time. No headache or neck stiffness indicate meningitis.    -----------    Blood sugar found to be over 1000. Has metabolic acidosis as well. After first liter of fluid given, will start insulin. Is in DKA. Has elevated anion gap. Potassium 4.7. Will replete with oral potassium only given presence of DOMINICK. DOMINICK appears prerenal.  No abdominal pain to indicate obstructive uropathy. Will check for UTI as well. Anticipate admission to ICU    Spoke with patient's sister as well as patient's daughter about his condition. Admitted to hospitalist service. Started insulin drip. Chest x-ray shows no pneumonia. The total critical care time spent while evaluating and treating this patient was at least 35 minutes. This excludes time spent doing separately billable procedures. This includes time at the bedside, data interpretation, medication management, obtaining critical history from collateral sources if the patient is unable to provide it directly, and physician consultation.   Specifics of interventions taken and potentially life-threatening diagnostic considerations are listed above in the medical

## 2021-05-15 NOTE — PROGRESS NOTES
Reassessment complete, vitals obtained. Insulin, fluids and PRN replacements per DKA protocol per MAR. Pt's wife at bedside & updated on plan of care.  Pt resting eyes closed,  No distress, no needs identified at this time

## 2021-05-15 NOTE — CONSULTS
[facility]                     [facility], [address]                                  CONSULTATION    PATIENT NAME: Michelle Gonzalez                        :        1955  MED REC NO:   [id number]                          ROOM:       3419  ACCOUNT NO:   [de-identified]                           ADMIT DATE: 2021  PROVIDER:     Kilo Coy MD    RENAL CONSULTATION    CONSULT DATE:  05/15/2021    CONSULTING PHYSICIAN:  Kilo Coy MD    REFERRING PROVIDER:  Juan Lagos MD    REASON FOR CONSULTATION:  Acute kidney injury, hypernatremia. HISTORY OF PRESENT ILLNESS:  The patient is a 72-year-old male with  history of asthma, GI bleed, hypertension, obesity, chronic pain, recent  diagnosis of diabetes mellitus type 2 about two months ago, has not been  compliant with his medications who presents to the hospital yesterday  secondary to severe hyperglycemia. Initially, blood sugar was reported  at 474. On presentation here to the hospital, his glucose was 1027 with  serum bicarbonate of 19 and a creatinine of 3.8. With IV fluid  administration, creatinine has improved to 2.3 and serum bicarb has  improved to 22. He is currently on insulin drip. His lowest systolic  blood pressure was in the 90s range. He denies any regular NSAID use. He denies any vomiting or diarrhea. He did admit to having decreased  p.o. intake for the past three days. PAST MEDICAL HISTORY:  Includes recent diagnosis of diabetes mellitus  type 2, obesity, asthma, chronic pain, GI bleed, hypertension. ALLERGIES:  Includes NAPROXEN and OMEPRAZOLE. MEDICATIONS PRIOR TO ADMISSION:  Includes naproxen, clindamycin,  ciprofloxacin, silver sulfadiazine, famotidine, lisinopril, Zyrtec-D.    SOCIAL HISTORY:  Has been noncompliant with his medicines, quit smoking  11 years ago. Positive alcohol use. Denies any drug abuse.     FAMILY HISTORY:  Includes asthma, hyperlipidemia, hypertension. REVIEW OF SYSTEMS:  GENERAL:  Positive malaise. SKIN:  No skin rash, itching or discharge. NEUROLOGIC:  Sleepy. No headaches or focal deficits. CARDIOVASCULAR:  No chest pain or palpitations. PULMONARY:  No shortness of breath. No coughing, no hemoptysis. GI:  No abdominal pain. No nausea, no vomiting, no diarrhea. Decreased  p.o. intake. RENAL:  Denies any gross hematuria or change in urine output. ENDOCRINE:  History of diabetes. No heat or cold intolerance. ID:  No fever or chills. MUSCULOSKELETAL:  Denies active joint pain. PHYSICAL EXAMINATION:  VITAL SIGNS:  Blood pressure 111/64, pulse 89, respirations 21,  temperature 97.4, saturating 94%. Weight listed at 148.4 kg. Urine  output recorded at 1350 mL, positive 2.3 liters since admission. GENERAL:  Appears tired. HEENT:  Anicteric sclerae. Clear conjunctivae. SKIN:  Anicteric. No rash. CHEST:  Clear. HEART:  Regular. ABDOMEN:  Obese, soft, nontender. No rebound or involuntary guarding. EXTREMITIES:  Shows 1+ edema. LABORATORY DATA:  Sodium 145, potassium 4.1, chloride 114, bicarb 22,  BUN 64, creatinine 2.3, glucose 408, calcium 8.9. Albumin 3.9. WBC  8.2, hemoglobin 14.4, hematocrit 43.7, platelet count 297,777. DIAGNOSTIC DATA:  Chest x-ray shows clear lungs and cardiomegaly. ASSESSMENT AND PLAN:  1. Acute kidney injury. Baseline creatinine 0.7 in 10/2020. Due to  prerenal azotemia. improved with IV fluid administration and better  blood glucose control. Will use half normal saline as maintenance IV  fluid. 2.  Hypernatremia. Follow with half normal saline administration. 3.  Anion gap metabolic acidosis plus metabolic alkalosis, may have  component of intravascular volume depletion. Anion gap has improved. DKA management as per Medicine. 4.  Hemoglobin is acceptable. 5.  History of hypertension. BP was low on presentation; however, has  now improved. Continue off ACE inhibitor for now. 6.  History of noncompliance. The patient is high risk, currently in the ICU with DKA and severe DOMINICK. Would need close followup. Thank you for this consult.         Galilea Walker MD    D: 05/15/2021 11:03:21       T: 05/15/2021 11:14:26     [email or web address]  Job#: [id number]     Doc#: [id number]    CC:

## 2021-05-15 NOTE — PROGRESS NOTES
Assessment complete. VSS. Remains lethargic but A/O x4. Currently requiring 3L O2. Lungs clear. BLE swelling and redness. Pedal and radial pulses palpable. Voided 500 mL per urinal. Peripheral IV sites unremarkable and infusing. See MAR for details.

## 2021-05-15 NOTE — CONSULTS
Renal Consult   Full Note Dictated [id number]  A/P  1. DOMINICK-due to prerenal azotemia. Will use 1/2NSS as IVF. Better. 2.  DKA-Agap better. Per Medicine. 3.  Metabolic Alkalosis+AGMA  4. Hypotension  5. H/O Non-compliance  6. Hypernatremia-1/2NSS    High risk. Thank you.

## 2021-05-16 LAB
ANION GAP SERPL CALCULATED.3IONS-SCNC: 14 MMOL/L (ref 3–16)
BASOPHILS ABSOLUTE: 0 K/UL (ref 0–0.2)
BASOPHILS RELATIVE PERCENT: 0.5 %
BUN BLDV-MCNC: 42 MG/DL (ref 7–20)
CALCIUM SERPL-MCNC: 9.5 MG/DL (ref 8.3–10.6)
CHLORIDE BLD-SCNC: 116 MMOL/L (ref 99–110)
CO2: 18 MMOL/L (ref 21–32)
CREAT SERPL-MCNC: 1.5 MG/DL (ref 0.8–1.3)
EOSINOPHILS ABSOLUTE: 0.2 K/UL (ref 0–0.6)
EOSINOPHILS RELATIVE PERCENT: 2.3 %
GFR AFRICAN AMERICAN: 57
GFR NON-AFRICAN AMERICAN: 47
GLUCOSE BLD-MCNC: 206 MG/DL (ref 70–99)
GLUCOSE BLD-MCNC: 234 MG/DL (ref 70–99)
GLUCOSE BLD-MCNC: 234 MG/DL (ref 70–99)
GLUCOSE BLD-MCNC: 250 MG/DL (ref 70–99)
GLUCOSE BLD-MCNC: 277 MG/DL (ref 70–99)
GLUCOSE BLD-MCNC: 316 MG/DL (ref 70–99)
GLUCOSE BLD-MCNC: 392 MG/DL (ref 70–99)
HCT VFR BLD CALC: 45.7 % (ref 40.5–52.5)
HEMOGLOBIN: 15.1 G/DL (ref 13.5–17.5)
LYMPHOCYTES ABSOLUTE: 1.2 K/UL (ref 1–5.1)
LYMPHOCYTES RELATIVE PERCENT: 16.2 %
MAGNESIUM: 3 MG/DL (ref 1.8–2.4)
MCH RBC QN AUTO: 30.7 PG (ref 26–34)
MCHC RBC AUTO-ENTMCNC: 33 G/DL (ref 31–36)
MCV RBC AUTO: 92.9 FL (ref 80–100)
MONOCYTES ABSOLUTE: 0.6 K/UL (ref 0–1.3)
MONOCYTES RELATIVE PERCENT: 7.9 %
NEUTROPHILS ABSOLUTE: 5.3 K/UL (ref 1.7–7.7)
NEUTROPHILS RELATIVE PERCENT: 73.1 %
PDW BLD-RTO: 14.4 % (ref 12.4–15.4)
PERFORMED ON: ABNORMAL
PHOSPHORUS: 1.8 MG/DL (ref 2.5–4.9)
PLATELET # BLD: 231 K/UL (ref 135–450)
PMV BLD AUTO: 10.6 FL (ref 5–10.5)
POTASSIUM SERPL-SCNC: 3.9 MMOL/L (ref 3.5–5.1)
RBC # BLD: 4.92 M/UL (ref 4.2–5.9)
SODIUM BLD-SCNC: 148 MMOL/L (ref 136–145)
WBC # BLD: 7.3 K/UL (ref 4–11)

## 2021-05-16 PROCEDURE — 97530 THERAPEUTIC ACTIVITIES: CPT

## 2021-05-16 PROCEDURE — 83735 ASSAY OF MAGNESIUM: CPT

## 2021-05-16 PROCEDURE — 2000000000 HC ICU R&B

## 2021-05-16 PROCEDURE — 1200000000 HC SEMI PRIVATE

## 2021-05-16 PROCEDURE — 97162 PT EVAL MOD COMPLEX 30 MIN: CPT

## 2021-05-16 PROCEDURE — 36415 COLL VENOUS BLD VENIPUNCTURE: CPT

## 2021-05-16 PROCEDURE — 2580000003 HC RX 258: Performed by: INTERNAL MEDICINE

## 2021-05-16 PROCEDURE — 2500000003 HC RX 250 WO HCPCS: Performed by: INTERNAL MEDICINE

## 2021-05-16 PROCEDURE — 83036 HEMOGLOBIN GLYCOSYLATED A1C: CPT

## 2021-05-16 PROCEDURE — 6360000002 HC RX W HCPCS: Performed by: INTERNAL MEDICINE

## 2021-05-16 PROCEDURE — 84100 ASSAY OF PHOSPHORUS: CPT

## 2021-05-16 PROCEDURE — 85025 COMPLETE CBC W/AUTO DIFF WBC: CPT

## 2021-05-16 PROCEDURE — 80048 BASIC METABOLIC PNL TOTAL CA: CPT

## 2021-05-16 PROCEDURE — 6370000000 HC RX 637 (ALT 250 FOR IP): Performed by: INTERNAL MEDICINE

## 2021-05-16 PROCEDURE — 97165 OT EVAL LOW COMPLEX 30 MIN: CPT

## 2021-05-16 RX ORDER — ACETAMINOPHEN 325 MG/1
650 TABLET ORAL EVERY 4 HOURS PRN
Status: DISCONTINUED | OUTPATIENT
Start: 2021-05-16 | End: 2021-05-17 | Stop reason: HOSPADM

## 2021-05-16 RX ADMIN — POTASSIUM PHOSPHATE, MONOBASIC POTASSIUM PHOSPHATE, DIBASIC 30 MMOL: 224; 236 INJECTION, SOLUTION, CONCENTRATE INTRAVENOUS at 09:52

## 2021-05-16 RX ADMIN — INSULIN LISPRO 6 UNITS: 100 INJECTION, SOLUTION INTRAVENOUS; SUBCUTANEOUS at 08:36

## 2021-05-16 RX ADMIN — SODIUM CHLORIDE: 4.5 INJECTION, SOLUTION INTRAVENOUS at 11:06

## 2021-05-16 RX ADMIN — INSULIN LISPRO 9 UNITS: 100 INJECTION, SOLUTION INTRAVENOUS; SUBCUTANEOUS at 12:08

## 2021-05-16 RX ADMIN — INSULIN LISPRO 15 UNITS: 100 INJECTION, SOLUTION INTRAVENOUS; SUBCUTANEOUS at 20:46

## 2021-05-16 RX ADMIN — SODIUM CHLORIDE: 4.5 INJECTION, SOLUTION INTRAVENOUS at 02:21

## 2021-05-16 RX ADMIN — HEPARIN SODIUM 5000 UNITS: 5000 INJECTION INTRAVENOUS; SUBCUTANEOUS at 05:47

## 2021-05-16 RX ADMIN — INSULIN LISPRO 6 UNITS: 100 INJECTION, SOLUTION INTRAVENOUS; SUBCUTANEOUS at 05:44

## 2021-05-16 RX ADMIN — INSULIN LISPRO 12 UNITS: 100 INJECTION, SOLUTION INTRAVENOUS; SUBCUTANEOUS at 00:20

## 2021-05-16 RX ADMIN — INSULIN LISPRO 6 UNITS: 100 INJECTION, SOLUTION INTRAVENOUS; SUBCUTANEOUS at 17:51

## 2021-05-16 RX ADMIN — FAMOTIDINE 10 MG: 20 TABLET ORAL at 20:59

## 2021-05-16 RX ADMIN — Medication 10 ML: at 09:22

## 2021-05-16 RX ADMIN — HEPARIN SODIUM 5000 UNITS: 5000 INJECTION INTRAVENOUS; SUBCUTANEOUS at 14:57

## 2021-05-16 RX ADMIN — FAMOTIDINE 10 MG: 20 TABLET ORAL at 09:22

## 2021-05-16 ASSESSMENT — PAIN SCALES - GENERAL
PAINLEVEL_OUTOF10: 0

## 2021-05-16 ASSESSMENT — ENCOUNTER SYMPTOMS
ABDOMINAL PAIN: 0
EYE REDNESS: 0
CHEST TIGHTNESS: 0
PHOTOPHOBIA: 0
FACIAL SWELLING: 0
ABDOMINAL DISTENTION: 0
CONSTIPATION: 0
EYE DISCHARGE: 0
DIARRHEA: 0
COUGH: 0
SHORTNESS OF BREATH: 0
BLOOD IN STOOL: 0
VOMITING: 0
NAUSEA: 0

## 2021-05-16 NOTE — PROGRESS NOTES
[facility] ICU Renal Hospital Note    Patient Active Problem List   Diagnosis    Cellulitis of scrotum    Lymphedema of both lower extremities    Ulcer of lower limb ([address]    Hypertension    Obesity, morbid ([address]    Cellulitis of leg, right    Cellulitis of right foot    Ulcer of right leg ([address]    Cellulitis of right leg    Chronic venous hypertension (idiopathic) with ulcer and inflammation of right lower extremity (HCC)    Uncontrolled type 2 diabetes mellitus with ketoacidosis without coma ([address]       Past Medical History:   has a past medical history of Asthma, Chronic pain, GI bleed, Hypertension, and Pain, knee, right. Past Social History:   reports that he quit smoking about 11 years ago. He has never used smokeless tobacco. He reports current alcohol use. He reports that he does not use drugs. Subjective:  Off DKA protocol. Sitting in chair, eating well and no complaints. Review of Systems   Constitutional: Negative for activity change, appetite change, chills, fatigue, fever and unexpected weight change. HENT: Negative for congestion and facial swelling. Eyes: Negative for photophobia, discharge and redness. Respiratory: Negative for cough, chest tightness and shortness of breath. Cardiovascular: Positive for leg swelling. Negative for chest pain and palpitations. Gastrointestinal: Negative for abdominal distention, abdominal pain, blood in stool, constipation, diarrhea, nausea and vomiting. Endocrine: Negative for cold intolerance, heat intolerance and polyuria. Genitourinary: Negative for decreased urine volume, difficulty urinating, flank pain and hematuria. Musculoskeletal: Negative for joint swelling and neck pain. Neurological: Negative for dizziness, seizures, syncope, speech difficulty, light-headedness and headaches. Hematological: Does not bruise/bleed easily.    Psychiatric/Behavioral: Negative for agitation,

## 2021-05-16 NOTE — PROGRESS NOTES
Nursing reassessment completed. No acute changes at this time. Patient was assisted back into bed. Assisted with repositioning. VSS.  NSR. Afebrile. Denies pain. See complex flowsheet for specific data. SR up x3. Call light in reach.

## 2021-05-16 NOTE — PROGRESS NOTES
Occupational Therapy   Occupational Therapy Initial Assessment  Date: 2021   Patient Name: Aimee Tate  MRN: [id number]     : 1955    Date of Service: 2021    Discharge Recommendations: Aimee Tate scored a 21/24 on the [facility] ADL Inpatient form. At this time, no further OT is recommended upon discharge due to performing near baseline level. Recommend patient returns to prior setting with prior services. OT Equipment Recommendations  Equipment Needed: Yes  Mobility Devices: ADL Assistive Devices  ADL Assistive Devices: Shower Chair with back    Assessment   Performance deficits / Impairments: Decreased endurance;Decreased functional mobility ; Decreased ADL status  Assessment: 63 y.o. male presents w/ the above deficits which are impacting his occupational performance. Pt would benefit from continued therapy during inpatient stay in order to maximize safety and independence upon discharge  Treatment Diagnosis: Performance deficits d/t uncontrolled diabetes  Prognosis: Good  Decision Making: Low Complexity  OT Education: OT Role;Plan of Care;Equipment  Patient Education: Pt verbalized understanding of education provided  REQUIRES OT FOLLOW UP: No  Activity Tolerance  Activity Tolerance: Patient Tolerated treatment well  Safety Devices  Safety Devices in place: Yes  Type of devices: Left in chair;Nurse notified;Call light within reach           Patient Diagnosis(es): The encounter diagnosis was Diabetic ketoacidosis without coma associated with other specified diabetes mellitus ([facility] [place] [address] has a past medical history of Asthma, Chronic pain, GI bleed, Hypertension, and Pain, knee, right.   has a past surgical history that includes knee surgery.     Treatment Diagnosis: Performance deficits d/t uncontrolled diabetes      Restrictions  Restrictions/Precautions  Restrictions/Precautions: Up as Tolerated, Fall Risk (high fall risk)  Required Braces or Orthoses?: No  Position Activity Restriction Other position/activity restrictions: 63 y.o. male with history of asthma, GI bleed, essential hypertension, osteoarthritis, chronic pain, morbid obesity with BMI of 50.95 kg/m², who was recently diagnosed with diabetes type 2 about 2 months ago, has not been taking his medications presents to the emergency room with complaints of polydipsia, polyuria, generalized weakness. On evaluation in the emergency room, he was noted to have blood glucose of 1027. He also has elevated BUN of 83, creatinine 3.8 (baseline normal creatinine). Lactic acid at 2.2.     Subjective   General  Chart Reviewed: Yes  Patient assessed for rehabilitation services?: Yes  Family / Caregiver Present: No  Diagnosis: Uncontrolled diabetes  Subjective  Subjective: Pt seated in recliner upon entry, agreeable to OT/PT cotx  Patient Currently in Pain: Denies  Vital Signs  Patient Currently in Pain: Denies  Social/Functional History  Social/Functional History  Lives With: Spouse, Family (lives with wife and son)  Type of Home: House  Home Layout: Two level, Bed/Bath upstairs (bedroom upstairs, bathroom on main level, 12 steps up to bedroom/bathroom)  Home Access: Stairs to enter without rails  Entrance Stairs - Number of Steps: 1 step  Bathroom Shower/Tub: Walk-in shower  Bathroom Toilet: Standard  Bathroom Accessibility: Not accessible  ADL Assistance: Independent  Homemaking Assistance: Independent  Homemaking Responsibilities: Yes  Ambulation Assistance: Independent  Transfer Assistance: Independent  Active : Yes  Mode of Transportation: Car  Occupation: Full time employment  Type of occupation: works for [employer] 40+ hours per week  Additional Comments: no falls in past six month       Objective        Orientation  Overall Orientation Status: Within Normal Limits  Observation/Palpation  Posture: Fair  Balance  Sitting Balance: Independent  Standing Balance: Stand by assistance  Standing Balance  Time: ~10 min total  Activity: Functional Farnaz Muro, OT   Farnaz Muro OTR/L, [place] #[id number]

## 2021-05-16 NOTE — PLAN OF CARE
Problem: Falls - Risk of:  Goal: Will remain free from falls  Description: Will remain free from falls  Outcome: Ongoing  Requires 1 person assist up to chair and back to bed. Problem: Skin Integrity:  Goal: Will show no infection signs and symptoms  Description: Will show no infection signs and symptoms  Outcome: Ongoing   Encouraging patient to C&DB and turn side to side Q2H.

## 2021-05-16 NOTE — PROGRESS NOTES
Physical Therapy    Facility/Department: [facility] ICU  Initial Assessment    NAME: Derek Smyth  : 1955  MRN: [id number]    Date of Service: 2021    Discharge Recommendations:  Derek Smyth scored a 23/24 on the AM-PAC short mobility form. At this time, no further PT is recommended upon discharge due to patient performing at baseline following medical resolution. Recommend patient returns to prior setting with prior services. Home independently   PT Equipment Recommendations  Equipment Needed: No    Assessment   Body structures, Functions, Activity limitations: Decreased functional mobility ; Decreased endurance;Decreased balance;Decreased strength;Decreased posture  Assessment: Patient presents with dx of uncontrolled type 2 DM with ketoacidosis without coma and the above listed impairments/limitations contributing at his inability to perform at baseline functional level. Patient will benefit from skilled therapy in order to return to baseline functional level. Treatment Diagnosis: decreased functional mobility, decreased endurance, decreased balance  Prognosis: Good  Decision Making: Medium Complexity  History: DM, astham  PT Education: Goals;PT Role;Plan of Care  Patient Education: d/c recommendations - patient verbalized understanding  Barriers to Learning: none  REQUIRES PT FOLLOW UP: Yes  Activity Tolerance  Activity Tolerance: Patient Tolerated treatment well       Patient Diagnosis(es): The encounter diagnosis was Diabetic ketoacidosis without coma associated with other specified diabetes mellitus ([facility] [address] has a past medical history of Asthma, Chronic pain, GI bleed, Hypertension, and Pain, knee, right.   has a past surgical history that includes knee surgery.     Restrictions  Restrictions/Precautions  Restrictions/Precautions: Up as Tolerated, Fall Risk (high fall risk)  Required Braces or Orthoses?: No  Position Activity Restriction  Other position/activity restrictions: 63 y.o. male with history of asthma, GI bleed, essential hypertension, osteoarthritis, chronic pain, morbid obesity with BMI of 50.95 kg/m², who was recently diagnosed with diabetes type 2 about 2 months ago, has not been taking his medications presents to the emergency room with complaints of polydipsia, polyuria, generalized weakness. On evaluation in the emergency room, he was noted to have blood glucose of 1027. He also has elevated BUN of 83, creatinine 3.8 (baseline normal creatinine). Lactic acid at 2.2. Vision/Hearing  Vision: Impaired (patient currently wearing contacts, patient has glasses but states\"they messed up glassses\" so that is why he has contacts in during therapy session)  Hearing: Within functional limits     Subjective  General  Chart Reviewed: Yes  Patient assessed for rehabilitation services?: Yes  Family / Caregiver Present: No  Diagnosis: uncontrolled type 2 diabetes mellitus with ketoacidosis without coma  Follows Commands: Within Functional Limits  General Comment  Comments: patient seated upright in chair upon arrival. nurse present. Subjective  Subjective: patient denies pain. patient agreeable to therapy.   Pain Screening  Patient Currently in Pain: Denies  Vital Signs  Patient Currently in Pain: Denies       Orientation  Orientation  Overall Orientation Status: Within Functional Limits  Social/Functional History  Social/Functional History  Lives With: Spouse, Family (lives with wife and son)  Type of Home: House  Home Layout: Two level, Bed/Bath upstairs (bedroom upstairs, bathroom on main level, 12 steps up to bedroom/bathroom)  Home Access: Stairs to enter without rails  Entrance Stairs - Number of Steps: 1 step  Bathroom Shower/Tub: Walk-in shower  Bathroom Toilet: Standard  Bathroom Accessibility: Not accessible  ADL Assistance: [address] [place] [address] Avenue: Independent  Homemaking Responsibilities: Yes  Ambulation Assistance: Independent  Transfer Assistance: Independent  Active : Yes  Mode of Transportation: Car  Occupation: Full time employment  Type of occupation: works for [employer] 40+ hours per week  Additional Comments: no falls in past six month  Cognition   Cognition  Overall Cognitive Status: WFL    Objective     Observation/Palpation  Posture: Fair    AROM RLE (degrees)  RLE AROM: WFL  AROM LLE (degrees)  LLE AROM : WFL  Strength RLE  Strength RLE: WFL  Strength LLE  Strength LLE: WFL     Sensation  Overall Sensation Status: WFL     Transfers  Sit to Stand: Stand by assistance (from seated in recliner to standing x 2 with SBA)  Stand to sit: Stand by assistance (from standing to seated in recliner x 2 with SBA)  Ambulation  Ambulation?: Yes  More Ambulation?: No  Ambulation 1  Surface: level tile  Device: No Device (patient utilized IV pole to ambulate with)  Assistance: Stand by assistance  Gait Deviations: Increased LULU; Decreased step length (side to side lateral sway with ambulation)  Distance: 270' x 1  Stairs/Curb  Stairs?: No     Balance  Posture: Fair  Sitting - Static: Fair  Sitting - Dynamic: Fair  Standing - Static: Fair  Standing - Dynamic: [address]  Times per week: 3-5  Times per day: Daily  Plan weeks: till discharge  Current Treatment Recommendations: Strengthening, Balance Training, ROM, Endurance Training, Functional Mobility Training, Pain Management, Transfer Training, Gait Training  Safety Devices  Type of devices: Call light within reach, Left in chair, Chair alarm in place, Nurse notified, All fall risk precautions in place  Restraints  Initially in place: No      AM-PAC Score  AM-PAC Inpatient Mobility Raw Score : 23 (05/16/21 1256)  AM-PAC Inpatient T-Scale Score : 56.93 (05/16/21 1256)  Mobility Inpatient CMS 0-100% Score: 11.2 (05/16/21 1256)  Mobility Inpatient CMS G-Code Modifier : CI (05/16/21 1256)          Goals  Short term goals  Time Frame for Short term goals: by discharge  Short term goal 1: patient will ambulate 400' with mod I  Short term

## 2021-05-16 NOTE — PROGRESS NOTES
Nursing assessment completed. Afebrile. VSS. NSR 90 range. Patient resting in bed. Son at bedside. Denies pain. Sleepy, but arouses easily. Neuro A&Ox3 to person, place & situation. Reminded patient re: day/time. LS diminished. S1S2 heart tones. Skin warm, natural, dry and intact. ABD soft +BSx4. BLE skin wrinkled deeply, very dry and edematous. Voids per urinal yellow clear 500 cc to 600 cc at a times. Requires assistance with the urinal.  0.45 NS infusing at 125 cc/hr. SR up x3. Call light in reach. In lc bed. SR up x3. Call light in reach. No acute distress at this time. Provided fresh water.

## 2021-05-16 NOTE — PROGRESS NOTES
Hospitalist Progress Note      PCP: Manuel Porter    Date of Admission: 5/14/2021    Chief Complaint: DKA    Hospital Course: This is a pleasant 63 y.o. male with history of asthma, GI bleed, essential hypertension, osteoarthritis, chronic pain, morbid obesity with BMI of 50.95 kg/m², who was recently diagnosed with diabetes type 2 about 2 months ago, has not been taking his medications presents to the emergency room with complaints of polydipsia, polyuria, generalized weakness. On evaluation in the emergency room, he was noted to have blood glucose of 1027. He also has elevated BUN of 83, creatinine 3.8 (baseline normal creatinine). Lactic acid at 2.2.     Subjective: Pt seen and examined at bedside. Reports chronic generalized body aches. No acute events. Off of insulin drip. Got Lantus SQ last night. Blood sugars in 200s. Pt is requesting to receive Vicodin for gen body aches. Has not tried anything here including tylenol.        Medications:  Reviewed    Infusion Medications    sodium chloride 125 mL/hr at 05/16/21 0559    dextrose       Scheduled Medications    famotidine  10 mg Oral BID    lidocaine 1 % injection  5 mL Intradermal Once    sodium chloride flush  5-40 mL Intravenous 2 times per day    insulin glargine  0.2 Units/kg Subcutaneous Nightly    insulin lispro  0-18 Units Subcutaneous Q4H    heparin (porcine)  5,000 Units Subcutaneous 3 times per day     PRN Meds: meclizine, sodium chloride flush, glucose, dextrose, glucagon (rDNA), dextrose, dextrose, magnesium sulfate      Intake/Output Summary (Last 24 hours) at 5/16/2021 0851  Last data filed at 5/16/2021 0600  Gross per 24 hour   Intake 4755.61 ml   Output 2500 ml   Net 2255.61 ml       Physical Exam Performed:    BP (!) 152/90   Pulse 87   Temp 98.2 °F (36.8 °C) (Temporal)   Resp 23   Ht 5' 9\" (1.753 m)   Wt (!) 371 lb 7 oz (168.5 kg)   SpO2 92%   BMI 54.85 kg/m²     General appearance: morbidly obese male, No apparent Likely secondary to medication non-use in recently diagnosed diabetes. Added HbA1c to am labs. Off of insulin drip. Lantus last night. Low carb diet. Blood sugars in 200s. Lantus dose changed to 50 units SQ qHS. Diabetes education. Meds to bed in am before discharge. Acute kidney injury. Likely prerenal azotemia from hyperglycemia/severe dehydration. Improving renal paramters. AG now normal.   Avoid/hold nephrotoxic medications. Appreciate nephro recs. Lactic acidosis. Likely in the setting of DKA. So far, no evidence of infectious process. Continue intravenous fluid repletion, trend lactic acid. Prolonged QTc noted on EKG. Likely secondary to underlying electrolyte abnormalities. Avoid QTprolonging medications. Chronic pain:   Pt reports he gets vicodin as out-pt. Not verified in home med list.   Recommend trying tylenol prn initially. Will add tramadol if no relief.     morbid obesity with BMI of 50.95 kg/m². Low calorie diet. Education provided.      Other comorbidities: history of asthma, GI bleed, essential hypertension, osteoarthritis     Activities: Up with assist  Prophylaxis: Subcutaneous heparin  Diet: DIET CARB CONTROL;  Code Status: Full Code    PT/OT Eval Status:pending  Dispo - d/c in am    Kate Sears MD

## 2021-05-17 VITALS
SYSTOLIC BLOOD PRESSURE: 147 MMHG | HEIGHT: 69 IN | TEMPERATURE: 97 F | BODY MASS INDEX: 46.65 KG/M2 | WEIGHT: 315 LBS | OXYGEN SATURATION: 98 % | DIASTOLIC BLOOD PRESSURE: 81 MMHG | RESPIRATION RATE: 20 BRPM | HEART RATE: 97 BPM

## 2021-05-17 PROBLEM — E11.10 UNCONTROLLED TYPE 2 DIABETES MELLITUS WITH KETOACIDOSIS WITHOUT COMA (HCC): Status: RESOLVED | Noted: 2021-05-14 | Resolved: 2021-05-17

## 2021-05-17 LAB
ANION GAP SERPL CALCULATED.3IONS-SCNC: 10 MMOL/L (ref 3–16)
BASOPHILS ABSOLUTE: 0.1 K/UL (ref 0–0.2)
BASOPHILS RELATIVE PERCENT: 1.1 %
BUN BLDV-MCNC: 23 MG/DL (ref 7–20)
CALCIUM SERPL-MCNC: 9.2 MG/DL (ref 8.3–10.6)
CHLORIDE BLD-SCNC: 114 MMOL/L (ref 99–110)
CO2: 20 MMOL/L (ref 21–32)
CREAT SERPL-MCNC: 1 MG/DL (ref 0.8–1.3)
EOSINOPHILS ABSOLUTE: 0.3 K/UL (ref 0–0.6)
EOSINOPHILS RELATIVE PERCENT: 3.8 %
ESTIMATED AVERAGE GLUCOSE: 355.1 MG/DL
GFR AFRICAN AMERICAN: >60
GFR NON-AFRICAN AMERICAN: >60
GLUCOSE BLD-MCNC: 247 MG/DL (ref 70–99)
GLUCOSE BLD-MCNC: 248 MG/DL (ref 70–99)
GLUCOSE BLD-MCNC: 255 MG/DL (ref 70–99)
GLUCOSE BLD-MCNC: 261 MG/DL (ref 70–99)
GLUCOSE BLD-MCNC: 279 MG/DL (ref 70–99)
GLUCOSE BLD-MCNC: 301 MG/DL (ref 70–99)
HBA1C MFR BLD: 14 %
HCT VFR BLD CALC: 42.9 % (ref 40.5–52.5)
HEMOGLOBIN: 14 G/DL (ref 13.5–17.5)
LYMPHOCYTES ABSOLUTE: 1.6 K/UL (ref 1–5.1)
LYMPHOCYTES RELATIVE PERCENT: 23.3 %
MAGNESIUM: 2.4 MG/DL (ref 1.8–2.4)
MCH RBC QN AUTO: 30.6 PG (ref 26–34)
MCHC RBC AUTO-ENTMCNC: 32.7 G/DL (ref 31–36)
MCV RBC AUTO: 93.5 FL (ref 80–100)
MONOCYTES ABSOLUTE: 0.7 K/UL (ref 0–1.3)
MONOCYTES RELATIVE PERCENT: 10.2 %
NEUTROPHILS ABSOLUTE: 4.2 K/UL (ref 1.7–7.7)
NEUTROPHILS RELATIVE PERCENT: 61.6 %
ORGANISM: ABNORMAL
PDW BLD-RTO: 14.6 % (ref 12.4–15.4)
PERFORMED ON: ABNORMAL
PHOSPHORUS: 2.5 MG/DL (ref 2.5–4.9)
PLATELET # BLD: 203 K/UL (ref 135–450)
PMV BLD AUTO: 10.3 FL (ref 5–10.5)
POTASSIUM SERPL-SCNC: 3.6 MMOL/L (ref 3.5–5.1)
RBC # BLD: 4.58 M/UL (ref 4.2–5.9)
SODIUM BLD-SCNC: 144 MMOL/L (ref 136–145)
URINE CULTURE, ROUTINE: ABNORMAL
WBC # BLD: 6.8 K/UL (ref 4–11)

## 2021-05-17 PROCEDURE — 6370000000 HC RX 637 (ALT 250 FOR IP): Performed by: INTERNAL MEDICINE

## 2021-05-17 PROCEDURE — 85025 COMPLETE CBC W/AUTO DIFF WBC: CPT

## 2021-05-17 PROCEDURE — 6360000002 HC RX W HCPCS: Performed by: INTERNAL MEDICINE

## 2021-05-17 PROCEDURE — 2580000003 HC RX 258: Performed by: INTERNAL MEDICINE

## 2021-05-17 PROCEDURE — 83735 ASSAY OF MAGNESIUM: CPT

## 2021-05-17 PROCEDURE — 84100 ASSAY OF PHOSPHORUS: CPT

## 2021-05-17 PROCEDURE — 80048 BASIC METABOLIC PNL TOTAL CA: CPT

## 2021-05-17 PROCEDURE — 97530 THERAPEUTIC ACTIVITIES: CPT

## 2021-05-17 RX ORDER — LISINOPRIL 10 MG/1
10 TABLET ORAL DAILY
Status: DISCONTINUED | OUTPATIENT
Start: 2021-05-17 | End: 2021-05-17 | Stop reason: HOSPADM

## 2021-05-17 RX ORDER — MECLIZINE HCL 12.5 MG/1
12.5 TABLET ORAL 3 TIMES DAILY PRN
Qty: 20 TABLET | Refills: 0 | Status: SHIPPED | OUTPATIENT
Start: 2021-05-17 | End: 2021-05-27

## 2021-05-17 RX ORDER — LISINOPRIL 10 MG/1
10 TABLET ORAL DAILY
Qty: 30 TABLET | Refills: 1 | Status: SHIPPED | OUTPATIENT
Start: 2021-05-17

## 2021-05-17 RX ADMIN — INSULIN LISPRO 12 UNITS: 100 INJECTION, SOLUTION INTRAVENOUS; SUBCUTANEOUS at 19:58

## 2021-05-17 RX ADMIN — HEPARIN SODIUM 5000 UNITS: 5000 INJECTION INTRAVENOUS; SUBCUTANEOUS at 15:00

## 2021-05-17 RX ADMIN — Medication 10 ML: at 09:28

## 2021-05-17 RX ADMIN — FAMOTIDINE 10 MG: 20 TABLET ORAL at 09:24

## 2021-05-17 RX ADMIN — INSULIN GLARGINE 10 UNITS: 100 INJECTION, SOLUTION SUBCUTANEOUS at 08:28

## 2021-05-17 RX ADMIN — INSULIN LISPRO 6 UNITS: 100 INJECTION, SOLUTION INTRAVENOUS; SUBCUTANEOUS at 12:06

## 2021-05-17 RX ADMIN — INSULIN LISPRO 9 UNITS: 100 INJECTION, SOLUTION INTRAVENOUS; SUBCUTANEOUS at 08:29

## 2021-05-17 RX ADMIN — LISINOPRIL 10 MG: 10 TABLET ORAL at 09:25

## 2021-05-17 RX ADMIN — FAMOTIDINE 10 MG: 20 TABLET ORAL at 20:13

## 2021-05-17 RX ADMIN — INSULIN LISPRO 9 UNITS: 100 INJECTION, SOLUTION INTRAVENOUS; SUBCUTANEOUS at 16:53

## 2021-05-17 RX ADMIN — ACETAMINOPHEN 650 MG: 325 TABLET ORAL at 09:34

## 2021-05-17 RX ADMIN — SODIUM CHLORIDE: 4.5 INJECTION, SOLUTION INTRAVENOUS at 00:46

## 2021-05-17 ASSESSMENT — PAIN DESCRIPTION - PAIN TYPE: TYPE: CHRONIC PAIN

## 2021-05-17 ASSESSMENT — PAIN DESCRIPTION - LOCATION: LOCATION: LEG

## 2021-05-17 ASSESSMENT — PAIN DESCRIPTION - ORIENTATION: ORIENTATION: RIGHT;LEFT

## 2021-05-17 ASSESSMENT — PAIN SCALES - GENERAL
PAINLEVEL_OUTOF10: 10
PAINLEVEL_OUTOF10: 0
PAINLEVEL_OUTOF10: 0
PAINLEVEL_OUTOF10: 10

## 2021-05-17 NOTE — CONSULTS
Nutrition Education    Provided diet education on carb counting and label reading, stressed importance of portion control and measuring his foods. Reviewed meal spacing and beverage choices. Pt expressed understanding      · Verbally reviewed information with Patient  · Educated on carb control  · Written educational materials provided. · Contact name and number provided. · Refer to Patient Education activity for more details.       Electronically signed by Masoud Fields RD, LD on 5/17/21 at 2:56 PM EDT  Contact: [phone]

## 2021-05-17 NOTE — PROGRESS NOTES
Security returned items that were locked up at admission. Before bag was opened by , PT verified that bag was not tampered with at all and that the bag was not compromised. Report given to [facility], informed that all discharge paperwork was done and printed ready for PT to sign.

## 2021-05-17 NOTE — PROGRESS NOTES
[facility] Renal Hospital Note    Patient Active Problem List   Diagnosis    Cellulitis of scrotum    Lymphedema of both lower extremities    Ulcer of lower limb ([address]    Hypertension    Obesity, morbid ([address]    Cellulitis of leg, right    Cellulitis of right foot    Ulcer of right leg ([address]    Cellulitis of right leg    Chronic venous hypertension (idiopathic) with ulcer and inflammation of right lower extremity (HCC)       Past Medical History:   has a past medical history of Asthma, Chronic pain, GI bleed, Hypertension, and Pain, knee, right. Past Social History:   reports that he quit smoking about 11 years ago. He has never used smokeless tobacco. He reports current alcohol use. He reports that he does not use drugs. Subjective:  Off DKA protocol. Sitting in chair, eating well and no complaints reported     Objective:      /72   Pulse 92   Temp 97.2 °F (36.2 °C) (Temporal)   Resp 18   Ht 5' 9\" (1.753 m)   Wt (!) 370 lb 6 oz (168 kg)   SpO2 96%   BMI 54.69 kg/m²     Wt Readings from Last 3 Encounters:   05/17/21 (!) 370 lb 6 oz (168 kg)   09/16/20 (!) 418 lb (189.6 kg)   09/09/20 (!) 418 lb (189.6 kg)       BP Readings from Last 3 Encounters:   05/17/21 131/72   08/05/20 (!) 155/85   07/12/16 136/86     Chest- clear  Heart-regular  Abd-soft  Ext- 2+ edema    Labs  Hemoglobin   Date Value Ref Range Status   05/17/2021 14.0 13.5 - 17.5 g/dL Final     Hematocrit   Date Value Ref Range Status   05/17/2021 42.9 40.5 - 52.5 % Final     WBC   Date Value Ref Range Status   05/17/2021 6.8 4.0 - 11.0 K/uL Final     Platelets   Date Value Ref Range Status   05/17/2021 203 135 - 450 K/uL Final     Lab Results   Component Value Date    CREATININE 1.0 05/17/2021    BUN 23 (H) 05/17/2021     05/17/2021    K 3.6 05/17/2021     (H) 05/17/2021    CO2 20 (L) 05/17/2021       Assessment/Plan:  1. Acute kidney injury.   Baseline creatinine 0.7 in 10/2020. Due to  prerenal azotemia. Improved with IV fluid administration and better  blood glucose control. Decrease half normal saline. 2.  Hypernatremia. Follow with half normal saline administration. Increase free fluid intake. Better. Follow urine output. 3.  Low serum HCO3-+Agap. Blood glucose control. Follow for now. No need for HCO3 replacement at this time. 4.  Hemoglobin is acceptable. 5.  History of hypertension. BP was low on presentation; however, has  now improved. Continue off ACE inhibitor for now. Decrease IVF. No need for aggressive control today. 6.  History of noncompliance. 7.  Hypermagnesemia- not on supplements. Follow with better renal function. 8.  Hypophosphatemia-replaced    Renally stable   Follow up outpatient as needed   Follow with PCP closely       High complexity. Multiple medical problems. Discussed with patient and treatment team. Nurse   Thank you for allowing me to participate in this patient's care. Please do not hesitate to contact me for any questions/concerns. We will follow along with you.      Jacoby Feliz MD, MD  [facility] [address] [place] [address]   Phone: [phone] or [address] [employer]  Fax: [phone]

## 2021-05-17 NOTE — PROGRESS NOTES
Social History     Tobacco Use    Smoking status: Former Smoker     Quit date: 2009     Years since quittin.4    Smokeless tobacco: Never Used   Substance Use Topics    Alcohol use: Yes     Comment: occassionally     Drug use: No       ALLERGIES  Allergies   Allergen Reactions    Naproxen      GI Bleed    Omeprazole        MEDICATIONS  No current facility-administered medications on file prior to encounter. Current Outpatient Medications on File Prior to Encounter   Medication Sig Dispense Refill    naproxen (EC-NAPROSYN) 500 MG EC tablet Take 1 tablet by mouth every 12 hours as needed for Pain 20 tablet 3    silver sulfADIAZINE (SILVADENE) 1 % cream Apply to skin on right leg 25 g 0    famotidine (PEPCID AC) 10 MG tablet Take 10 mg by mouth 2 times daily.  cetirizine-psuedoephedrine (ZYRTEC-D) 5-120 MG per tablet Take 1 tablet by mouth 2 times daily.          Objective:     LABS    CBC:   Lab Results   Component Value Date    WBC 6.8 2021    RBC 4.58 2021    HGB 14.0 2021    HCT 42.9 2021    MCV 93.5 2021    MCH 30.6 2021    MCHC 32.7 2021    RDW 14.6 2021     2021    MPV 10.3 2021     CMP:    Lab Results   Component Value Date     2021    K 3.6 2021    K 4.7 2021     2021    CO2 20 2021    BUN 23 2021    CREATININE 1.0 2021    GFRAA >60 2021    GFRAA >60 2010    AGRATIO 1.1 2021    LABGLOM >60 2021    GLUCOSE 247 2021    PROT 7.6 2021    PROT 7.0 2010    PROT 7.6 2010    LABALBU 3.9 2021    CALCIUM 9.2 2021    BILITOT 0.3 2021    ALKPHOS 99 2021    AST 14 2021    ALT 29 2021       HgBA1c:    Lab Results   Component Value Date    LABA1C 14.0 2021       This patient's last creatinine was   Recent Labs     21  0507   CREATININE 1.0        Recent Blood sugars have been   Lab Results   Component Value Date    POCGLU 261 05/17/2021    POCGLU 255 05/17/2021    POCGLU 392 05/16/2021    POCGLU 234 05/16/2021    POCGLU 250 05/16/2021    POCGLU 206 05/16/2021    POCGLU 234 05/16/2021    POCGLU 316 05/16/2021     Lab Results   Component Value Date    GLUCOSE 247 05/17/2021    GLUCOSE 277 05/16/2021    GLUCOSE 197 05/15/2021    GLUCOSE 408 05/15/2021    GLUCOSE 510 05/15/2021    GLUCOSE 610 05/15/2021            Assessment:     Patient Active Problem List   Diagnosis    Cellulitis of scrotum    Lymphedema of both lower extremities    Ulcer of lower limb ([address]    Hypertension    Obesity, morbid (HCC)    Cellulitis of leg, right    Cellulitis of right foot    Ulcer of right leg (HCC)    Cellulitis of right leg    Chronic venous hypertension (idiopathic) with ulcer and inflammation of right lower extremity ([address]       Plan:     Plan of Care:   Consult placed to dietitian for further education  Nursing to continue to teach insulin injections while hospitalized. Continue to monitor blood sugars to determine adequacy of current dosages      Discharge Plan:  Patient will need insulin for home which will be new: 60 units lantus nightly per discharge order. Patient to f/u with PCP. Instructed to log blood sugars and take blood sugar log to his f/u appointment.     Jomar Grace MSN, RN, [address] [place] [address], [address] [place] [address]  Certified Diabetes Care and

## 2021-05-17 NOTE — DISCHARGE SUMMARY
[facility]    Patient Demographics    Patient. Elaina Self  Date of Birth. 1955  MRN. [id number]     Primary care provider. Jose Chin  (Tel: [phone])    Admit date: 5/14/2021    Discharge date (blank if same as Note Date): Note Date: 5/17/2021     Reason for Hospitalization. Chief Complaint   Patient presents with    Hyperglycemia     Pt. arrived via EMS with c/o fatigue x3 days. Pt. is a diabetic EMS reports sugar at 474. Pt. reports sugar has not been checked in 2 months D/T unable to use glucometer properly. Significant Findings. Active Problems:    * No active hospital problems. *  Resolved Problems:    Uncontrolled type 2 diabetes mellitus with ketoacidosis without coma ([address]       Problems and results from this hospitalization that need follow up. Poorly controlled type II DM  Medication noncompliance  Chronic pain    Significant test results and incidental findings. XR CHEST PORTABLE   Final Result   Clear lungs. Cardiomegaly. No acute cardiopulmonary abnormality. Invasive procedures and treatments. 1. None     Problem-based Hospital Course. This is a pleasant 60 y. o. male with history of asthma, GI bleed, essential hypertension, osteoarthritis, chronic pain, morbid obesity with BMI of 50.95 kg/m², who was recently diagnosed with diabetes type 2 about 2 months ago, has not been taking his medications presents to the emergency room with complaints of polydipsia, polyuria, generalized weakness.  On evaluation in the emergency room, he was noted to have blood glucose of 1027.  He also has elevated BUN of 83, creatinine 3.8 (baseline normal creatinine).  Lactic acid at 2.2.   Uncontrolled diabetes type 2 with DKA, without coma. [place] Catching secondary to medication non-use in recently diagnosed diabetes. Check hemoglobin A1c; 14%. Initially needed insulin drip.  Eventually transition to Lantus, discharged on 48 units subcu nightly. Provided diabetes education. Blood glucose monitoring supplies and medications provided at the time of discharge. Acute kidney injury.  Likely prerenal azotemia from hyperglycemia/severe dehydration. Renal function improved with IV fluids.    Lactic acidosis. In the setting of DKA. Infectious process ruled out. Resolved with IV fluids.   Prolonged QTc noted on EKG.  Likely secondary to underlying electrolyte abnormalities.  Avoid QTprolonging medications.     Chronic pain: Pt reports he gets vicodin as out-pt. however could not be verified on outpatient medication list. Offered Tylenol as needed. Patient to follow-up with PCP for pain control.   morbid obesity with BMI of 50.95 kg/m². Low calorie diet. Education provided.    Other comorbidities: history of asthma, GI bleed, essential hypertension, osteoarthritis    Consults. IP CONSULT TO HOSPITALIST  IP CONSULT TO DIABETES EDUCATOR  IP CONSULT TO NEPHROLOGY  PHARMACY TO DOSE MEDICATION  IP CONSULT TO DIETITIAN    Physical examination on discharge day. /72   Pulse 101   Temp 97.2 °F (36.2 °C) (Temporal)   Resp 18   Ht 5' 9\" (1.753 m)   Wt (!) 370 lb 6 oz (168 kg)   SpO2 96%   BMI 54.69 kg/m²      General appearance: morbidly obese male, No apparent distress, appears stated age and cooperative. Respiratory:  Normal respiratory effort. Clear to auscultation, bilaterally without Rales/Wheezes/Rhonchi. Cardiovascular: Regular rate and rhythm with normal S1/S2 without murmurs, rubs or gallops. Abdomen: Soft, non-tender, non-distended with normal bowel sounds. Musculoskeletal: No clubbing, cyanosis or edema bilaterally. Full range of motion without deformity. Skin: Skin color, texture, turgor normal.  No rashes or lesions. Neurologic:  Neurovascularly intact without any focal sensory/motor deficits.  Cranial nerves: II-XII intact, grossly non-focal.  Psychiatric: Alert and oriented, thought content appropriate, normal insight Capillary Refill: Brisk,3 seconds, normal   Peripheral Pulses: +2 palpable, equal bilaterally     Condition at time of discharge stable    Medication instructions provided to patient at discharge. Medication List      START taking these medications    blood glucose monitor kit and supplies  Dispense sufficient amount for indicated testing frequency plus additional to accommodate PRN testing needs. Dispense all needed supplies to include: monitor, strips, lancing device, lancets, control solutions, alcohol swabs.      insulin glargine 100 UNIT/ML injection pen  Commonly known as: LANTUS;BASAGLAR  Inject 60 Units into the skin nightly     Insulin Pen Needle 32G X 4 MM Misc  1 each by Does not apply route daily     meclizine 12.5 MG tablet  Commonly known as: ANTIVERT  Take 1 tablet by mouth 3 times daily as needed for Nausea        CHANGE how you take these medications    lisinopril 10 MG tablet  Commonly known as: PRINIVIL;ZESTRIL  Take 1 tablet by mouth daily  What changed:   · medication strength  · how much to take        CONTINUE taking these medications    cetirizine-psuedoephedrine 5-120 MG per extended release tablet  Commonly known as: ZYRTEC-D     naproxen 500 MG EC tablet  Commonly known as: EC-Naprosyn  Take 1 tablet by mouth every 12 hours as needed for Pain     Pepcid AC 10 MG tablet  Generic drug: famotidine     silver sulfADIAZINE 1 % cream  Commonly known as: SILVADENE  Apply to skin on right leg        STOP taking these medications    UNKNOWN TO PATIENT           Where to Get Your Medications      [facility] to [facility], [address] [place], [place] [address] [place] [facility] [id number]    Phone: [phone]   · blood glucose monitor kit and supplies  · insulin glargine 100 UNIT/ML injection pen  · Insulin Pen Needle 32G X 4 MM Misc  · lisinopril 10 MG tablet  · meclizine 12.5 MG tablet         Discharge recommendations given to patient. Follow Up. PCP in 1 week   Disposition. home  Activity. activity as tolerated  Diet: DIET CARB CONTROL; Carb Control: 3 carb choices (45 gms)/meal      Spent 45 minutes in discharge process.     Signed:  Fadia Coy MD     5/17/2021 1:18 PM

## 2021-05-17 NOTE — DISCHARGE INSTR - COC
Continuity of Care Form    Patient Name: Lizandro Dykes   :  1955  MRN:  [id number]    Admit date:  2021  Discharge date:  ***    Code Status Order: Full Code   Advance Directives:   [facility] Documentation     Date/Time Healthcare Directive Type of Healthcare Directive Copy in [facility] [address] Agent's Name Healthcare Agent's Phone Number    05/15/21 0900  No, patient does not have an advance directive for healthcare treatment -- -- -- -- --          Admitting Physician:  Meryle Scales, MD  PCP: Minnie Carroll    Discharging Nurse: [facility] Unit/Room#: FNM-[id number]/5908-47  Discharging Unit Phone Number: ***    Emergency Contact:   Extended Emergency Contact Information  Primary Emergency Contact: Maria R Padron  Address: [address] [place] [address], [address] [place] [address]  Home Phone: [phone]  Work Phone: [phone]  Relation: Spouse    Past Surgical History:  Past Surgical History:   Procedure Laterality Date    KNEE SURGERY      right knee       Immunization History: There is no immunization history on file for this patient.     Active Problems:  Patient Active Problem List   Diagnosis Code    Cellulitis of scrotum N49.2    Lymphedema of both lower extremities I89.0    Ulcer of lower limb ([address] L97.909    Hypertension I10    Obesity, morbid ([address] E66.01    Cellulitis of leg, right L03.115    Cellulitis of right foot L03.115    Ulcer of right leg (HCC) L97.919    Cellulitis of right leg L03.115    Chronic venous hypertension (idiopathic) with ulcer and inflammation of right lower extremity (HCC) I87.331, L97.919       Isolation/Infection:   Isolation          No Isolation        Patient Infection Status     Infection Onset Added Last Indicated Last Indicated By Review Planned Expiration Resolved Resolved By    None active    Resolved    MRSA  09/02/10 09/02/10 Carrie Yan   16 Brock Manzo RN    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           Nurse Assessment:  Last Vital Signs: BP (!) 149/75   Pulse 91   Temp 98 °F (36.7 °C) (Temporal)   Resp 23   Ht 5' 9\" (1.753 m)   Wt (!) 370 lb 6 oz (168 kg)   SpO2 97%   BMI 54.69 kg/m²     Last documented pain score (0-10 scale): Pain Level: 0  Last Weight:   Wt Readings from Last 1 Encounters:   05/17/21 (!) 370 lb 6 oz (168 kg)     Mental Status:  {IP PT MENTAL STATUS:20030}    IV Access:  { BHANU IV ACCESS:839151340}    Nursing Mobility/ADLs:  Walking   {CHP DME GRLL:908144601}  Transfer  {CHP DME PVSU:879008411}  Bathing  {CHP DME UUQZ:417058340}  Dressing  {CHP DME YTLT:619959677}  Toileting  {CHP DME VKDJ:577882154}  Feeding  {P DME AAUM:762663675}  Med Admin  {P DME AOHA:577969910}  Med Delivery   { BHANU MED Delivery:336155224}    Wound Care Documentation and Therapy:        Elimination:  Continence:   · Bowel: {YES / WI:34262}  · Bladder: {YES / TE:15149}  Urinary Catheter: {Urinary Catheter:390374921}   Colostomy/Ileostomy/Ileal Conduit: {YES / MK:92915}       Date of Last BM: ***    Intake/Output Summary (Last 24 hours) at 5/17/2021 1536  Last data filed at 5/17/2021 1400  Gross per 24 hour   Intake 240 ml   Output 2000 ml   Net -1760 ml     I/O last 3 completed shifts:   In: 240 [P.O.:240]  Out: 2000 [Urine:2000]    Safety Concerns:     [address] Concerns:998649109}    Impairments/Disabilities:      [facility] [employer] Impairments/Disabilities:698171510}    Nutrition Therapy:  Current Nutrition Therapy:   [facility] Diet List:925693511}    Routes of Feeding: {CHP DME Other Feedings:847982680}  Liquids: {Slp liquid thickness:27435}  Daily Fluid Restriction: {CHP DME Yes amt example:963456203}  Last Modified Barium Swallow with Video (Video Swallowing Test): {Done Not Done BFGY:488214800}    Treatments at the Time of Hospital Discharge:   Respiratory Treatments: ***  Oxygen Therapy:  {Therapy; copd oxygen:01476}  Ventilator:    { CC Vent KWG}    Rehab Therapies: {THERAPEUTIC INTERVENTION:4992005525}  Weight Bearing Status/Restrictions: {[facility] Weight Bearin}  Other Medical Equipment (for information only, NOT a DME order):  {EQUIPMENT:164398735}  Other Treatments: ***    Patient's personal belongings (please select all that are sent with patient):  {[facility] DME Belongings:527067964}    RN SIGNATURE:  {Esignature:757707825}    CASE MANAGEMENT/SOCIAL WORK SECTION    Inpatient Status Date: ***    Readmission Risk Assessment Score:  Readmission Risk              Risk of Unplanned Readmission:  12           Discharging to Facility/ Agency   · Name:   · Address:  · Phone:  · Fax:    Dialysis Facility (if applicable)   · Name:  · Address:  · Dialysis Schedule:  · Phone:  · Fax:    / signature: {Esignature:138270649}    PHYSICIAN SECTION    Prognosis: {Prognosis:1799452700}    Condition at Discharge: [address] [place] [address] Patient Condition:069630415}    Rehab Potential (if transferring to Rehab): {Prognosis:4788260134}    Recommended Labs or Other Treatments After Discharge: ***    Physician Certification: I certify the above information and transfer of Derek Smyth  is necessary for the continuing treatment of the diagnosis listed and that he requires {Admit to Appropriate Level of Care:89446} for {GREATER/LESS:190593726} 30 days.      Update Admission H&P: {CHP DME Changes in DCQSL:496112038}    PHYSICIAN SIGNATURE:  {Esignature:411089425}

## 2021-05-17 NOTE — CARE COORDINATION
Discharge planning note:    Chart reviewed and it appears that patient has minimal needs for discharge at this time. Spoke with nurse and he will alert me if any needs are identified. Therapy Evaluations completed:  AM-PAC Scores:  PT - 23/24,   OT - 21/24    PCP:  Dr. Jose Dunn - last seen 3/15/21    Consult to diabetic educator to be completed prior to discharge. Low risk of re-hospitalization at 12%    Case management will continue to follow progress and update discharge plan as needed.     Sindy Mabry RN BSN  Case Management  [phone]

## 2021-05-17 NOTE — PROGRESS NOTES
Physical Therapy  Facility/Department: [facility] ICU  Daily Treatment Note  NAME: Caitlin Freeman  : 1955  MRN: [id number]    Date of Service: 2021    Discharge Recommendations:  Caitlin Freeman scored a 23/24 on the AM-PAC short mobility form. At this time, no further PT is recommended upon discharge due to functioning close to baseline. Recommend patient returns to prior setting with prior services. Home independently   PT Equipment Recommendations  Equipment Needed: No    Assessment   Body structures, Functions, Activity limitations: Decreased functional mobility ; Decreased endurance;Decreased balance;Decreased strength;Decreased posture  Assessment: Dizziness affecting functional mobility initially but resolved with time. Ambulating with supervision assistance (due to dizziness) without LOB. Declined stairs stating son will help him if needed. Continued skilled PT while in hospital to ensure safe dc home. Treatment Diagnosis: decreased functional mobility, decreased endurance, decreased balance  Prognosis: Good  PT Education: Goals;PT Role;Plan of Care  Patient Education: d/c recommendations - patient verbalized understanding  Barriers to Learning: none  REQUIRES PT FOLLOW UP: Yes  Activity Tolerance  Activity Tolerance: Patient Tolerated treatment well     Patient Diagnosis(es): The encounter diagnosis was Diabetic ketoacidosis without coma associated with other specified diabetes mellitus ([facility] [address] has a past medical history of Asthma, Chronic pain, GI bleed, Hypertension, and Pain, knee, right.   has a past surgical history that includes knee surgery.     Restrictions  Restrictions/Precautions  Restrictions/Precautions: Up as Tolerated, Fall Risk (high fall risk)  Required Braces or Orthoses?: No  Position Activity Restriction  Other position/activity restrictions: 63 y.o. male with history of asthma, GI bleed, essential hypertension, osteoarthritis, chronic pain, morbid obesity with BMI of 50.95 kg/m², who was recently diagnosed with diabetes type 2 about 2 months ago, has not been taking his medications presents to the emergency room with complaints of polydipsia, polyuria, generalized weakness. On evaluation in the emergency room, he was noted to have blood glucose of 1027. He also has elevated BUN of 83, creatinine 3.8 (baseline normal creatinine). Lactic acid at 2.2. Subjective   General  Chart Reviewed: Yes  Response To Previous Treatment: Patient with no complaints from previous session. Family / Caregiver Present: No  Subjective  Subjective: agreed to therapy, was wondering when he would get his walk done  General Comment  Comments: in bed at arrival  Pain Screening  Patient Currently in Pain: Denies  Vital Signs  Patient Currently in Pain: Denies       Orientation     Cognition      Objective   Bed mobility  Supine to Sit: Modified independent  Scooting: Modified independent  Comment: Increased time due to type of mattress on bed limiting mobility. HOB elevated and use of bed railing. Transfers  Sit to Stand: Stand by assistance (from EOB and chair)  Stand to sit: Stand by assistance  Ambulation  Ambulation?: Yes  Ambulation 1  Surface: level tile  Device: No Device  Assistance: Supervision  Quality of Gait: med/lat sway due to habitus,  increased LULU  Gait Deviations: Slow Caty; Increased LULU  Distance: 540 ft  Stairs/Curb  Stairs?: No (declined)     Balance  Posture: Fair  Sitting - Static: Fair  Sitting - Dynamic: Fair  Standing - Static: Fair  Standing - Dynamic: Fair            Comment: Dizziness upon sitting up and when standing. BP WNL. Resolved with activity.               G-Code     OutComes Score                                                     AM-PAC Score  AM-PAC Inpatient Mobility Raw Score : 23 (05/17/21 1337)  AM-PAC Inpatient T-Scale Score : 56.93 (05/17/21 1337)  Mobility Inpatient CMS 0-100% Score: 11.2 (05/17/21 1337)  Mobility Inpatient CMS G-Code Modifier : CI (05/17/21 6203)          Goals-- no goals met   Short term goals  Time Frame for Short term goals: by discharge  Short term goal 1: patient will ambulate 400' with mod I  Short term goal 2: patient will ascend/descend 12 stairs with rail with supervision  Short term goal 3: patient will perform bed mobility independently  Short term goal 4: patient will perform sit to stand/stand to sit independently    Plan    Plan  Times per week: 3-5  Times per day: Daily  Plan weeks: till discharge  Current Treatment Recommendations: Strengthening, Balance Training, ROM, Endurance Training, Functional Mobility Training, Pain Management, Transfer Training, Gait Training  Safety Devices  Type of devices: Call light within reach, Left in chair, Chair alarm in place, Nurse notified, All fall risk precautions in place  Restraints  Initially in place: No     Therapy Time   Individual Concurrent Group Co-treatment   Time In 1309         Time Out 1336         Minutes 27         Timed Code Treatment Minutes: [facility] , [id number]

## 2021-05-18 NOTE — PROGRESS NOTES
Pt finished dinner, belongings packed up. Reviewed discharge instructions with pt and his son. provided with discharge packet and education. Gave pt his night time insulin and meds at his request so he could have them completed for the evening. Pt demonstrated appropriate use of insulin pen. No further questions from pt or son. Provided with unit number if he does have a question once arriving home. IV removed. No further needs.  Request placed for transport

## 2022-01-29 NOTE — Clinical Note
- Naman clayton/Kellie 01/23- continue amp  - EOT 02/07/22     Jenny Zimmerman saw Britney Cowart in the office today for evaluation of his legs. I believe your management at the wound care center is appropriate and I do not believe he is a candidate for any venous intervention. I would suggest that you begin using a Lymphapress bilaterally twice a day to control his chronic leg edema which is likely secondary lymphedema. Also referral to a bariatric surgery center would be appropriate for surgical control of his weight and with appropriate weight loss. If you have any questions please feel free to contact me. I will will be happy to discuss this further with you as necessary.     Elham Barker

## 2022-03-29 ENCOUNTER — HOSPITAL ENCOUNTER (OUTPATIENT)
Dept: PHYSICAL THERAPY | Age: 67
Setting detail: THERAPIES SERIES
Discharge: HOME | End: 2022-03-29
Payer: COMMERCIAL

## 2022-03-29 PROCEDURE — 97161 PT EVAL LOW COMPLEX 20 MIN: CPT

## 2022-03-29 PROCEDURE — 97530 THERAPEUTIC ACTIVITIES: CPT

## 2022-03-29 NOTE — FLOWSHEET NOTE
[facility]  Outpatient Physical Therapy  Phone: [phone]   Fax: [phone]    Physical Therapy Daily LYMPHEDEMA Treatment Note    Date:  3/29/2022    Patient Name:  Atilio Perdomo    :  1955  MRN: [id number]  Medical/Treatment Diagnosis Information:  · Diagnosis: I89.0 Lymphedema  · Treatment Diagnosis: BLE lymphedema, R>L  Insurance/Certification information:  PT Insurance Information: [facility], no CP, NO unattended estim  Physician Information:  Referring Practitioner: Indiana Foss MD  Plan of care signed (Y/N): []  Yes [x]  No     Date of Patient follow up with Physician:     Functional scale:   FOTO - physical primary measure: 64/100, risk adjusted: 47    3/29/22    Progress Report: []  Yes  [x]  No     Date Range for reporting period:  Beginning - 3/29  Ending    Progress report due (10 Rx/or 30 days whichever is less): visit #10 or 3/76 (date)     Recertification due (POC duration/ or 90 days whichever is less): visit #12 or  (date)     Visit # Insurance Allowable Auth required?  Date Range    Med nec []  Yes  [x]  No         Latex Allergy:  [x]NO      []YES  Preferred Language for Healthcare:   [x]English       []other:      Pain level:  10/10     SUBJECTIVE:  See eval    OBJECTIVE: See eval      RESTRICTIONS/PRECAUTIONS: DM (checks blood sugar weekly or when he is symptomatic), HTN     Exercises/Interventions:     Therapeutic Exercises (56554) Resistance / level Sets/sec Reps Notes          Pt educated on exercises to stimulate lymphatic flow                                     Therapeutic Activities (00417)  (Dynamic activities such as compression, designed to improve functional performance)       Compression: trial tensoshape size   applied to        Multilayer compression bandaging to   Stockinette  Artiflex  Foam   cm low stretch compression bandage  cm low stretch compression bandage  cm low stretch compression bandage  cm low stretch compression bandage                                              Home Management  (providing pt education on safety procedures/instructions)       Pt educated on compression as follows:   how to appropriately apply and wear compression  how to maintain appropriate gradient compression  do not sleep with compression garment/tensoshape  signs and symptoms of constriction   when to remove compression NV      Pt educated on lymphedema prevention and management  X 15 min      Educated on skin care and using gentle non scented lotion                                    Neuromuscular Re-ed (08159)                            Manual Intervention ([id number])       See MLD flowchart below NV                                           Manual Lymph Drainage (MLD):  MLD to B LEs, clearing along alternate pathway of  Ipsilateral trunk  to  Ipsilateral axillary  lymph nodes    Clear Nodes 10x each   Neck x   [address]    Axilla x   Abdomen x   Groin x   Popliteal x2 x   Clear Alternate Pathway 10x each   Re-clear alternate pathway   x5 each position x   Location Ipsilateral trunk        Fluid Mobilization 10x each   Re-clear alternate pathway   x5 each position x   Shoulder bracing    Location B LEs        Protein Resorption 10x each   Location Medial R thigh   B lower legs        Clear Foot/Ankle or Hand 10x each   Achilles x   Bilateral malleolus x   Fan the cards x   Clear dorsum x   Clear through web space x   Clear toes/fingers x   Fluid mobilization x   Re-clear all positions  X5 each x           Modalities:  Lymphedema pump NV     OTHER: 3/29 pt signed release of medical information form for [facility] to obtain advanced home lymphedema pump     Pt education:   3/29  Pt educated on pathology and anatomy of etiology of lymphedema, condition precautions, indications for long term prognosis.     Pt was educated on diagnosis; prognosis; PT POC including MLD, compression (role of multilayer bandaging/ compression garments), lymphedema management/prevention of flare ups, role of exercise, HEP, lymphedema pump; expectations for rehab. All pt questions were answered. HEP instruction:  3/29 Pt instructed in lymphedema management/prevention concepts. Bring circaids NV. Moisturize daily with gentle nonscented lotion. Therapeutic Exercise and NMR EXR  [] (22495) Provided verbal/tactile cueing for activities related to strengthening, flexibility, endurance, ROM for improvements in  [] LE / Lumbar: LE, proximal hip, and core control with self care, mobility, lifting, ambulation. [] UE / Cervical: cervical, postural, scapular, scapulothoracic and UE control with self care, reaching, carrying, lifting, house/yardwork, driving, computer work.  [] (38884) Provided verbal/tactile cueing for activities related to improving balance, coordination, kinesthetic sense, posture, motor skill, proprioception to assist with   [] LE / lumbar: LE, proximal hip, and core control in self care, mobility, lifting, ambulation and eccentric single leg control. [] UE / cervical: cervical, scapular, scapulothoracic and UE control with self care, reaching, carrying, lifting, house/yardwork, driving, computer work.   [] (85927) Therapist is in constant attendance of 2 or more patients providing skilled therapy interventions, but not providing any significant amount of measurable one-on-one time to either patient, for improvements in  [] LE / lumbar: LE, proximal hip, and core control in self care, mobility, lifting, ambulation and eccentric single leg control. [] UE / cervical: cervical, scapular, scapulothoracic and UE control with self care, reaching, carrying, lifting, house/yardwork, driving, computer work.      NMR and Therapeutic Activities:    [x] (07905 or 57994) Provided verbal/tactile cueing for activities related to improving balance, coordination, kinesthetic sense, posture, motor skill, proprioception and motor activation to allow for proper function of   [x] LE: / Lumbar core, proximal hip and LE with self care and ADLs  [] UE / Cervical: cervical, postural, scapular, scapulothoracic and UE control with self care, carrying, lifting, driving, computer work.   [] (56231) Gait Re-education- Provided training and instruction to the patient for proper LE, core and proximal hip recruitment and positioning and eccentric body weight control with ambulation re-education including up and down stairs     Home Management Training / Self Care:  [] (16901) Provided self-care/home management training related to activities of daily living and compensatory training, and/or use of adaptive equipment for improvement with: ADLs and compensatory training, meal preparation, safety procedures and instruction in use of adaptive equipment, including bathing, grooming, dressing, personal hygiene, basic household cleaning and chores.      Home Exercise Program:    [x] (52750) Reviewed/Progressed HEP activities related to strengthening, flexibility, endurance, ROM of   [] LE / Lumbar: core, proximal hip and LE for functional self-care, mobility, lifting and ambulation/stair navigation   [] UE / Cervical: cervical, postural, scapular, scapulothoracic and UE control with self care, reaching, carrying, lifting, house/yardwork, driving, computer work  [] (65612)Reviewed/Progressed HEP activities related to improving balance, coordination, kinesthetic sense, posture, motor skill, proprioception of   [] LE: core, proximal hip and LE for self care, mobility, lifting, and ambulation/stair navigation    [] UE / Cervical: cervical, postural,  scapular, scapulothoracic and UE control with self care, reaching, carrying, lifting, house/yardwork, driving, computer work    Manual Treatments:  PROM / STM / Oscillations-Mobs:  G-I, II, III, IV (PA's, Inf., Post.)  [] (31254) Provided manual therapy to mobilize LE, proximal hip and/or LS spine soft tissue/joints for the purpose of modulating pain, promoting relaxation,  increasing ROM, reducing/eliminating soft tissue swelling/inflammation/restriction, improving soft tissue extensibility and allowing for proper ROM for normal function with   [] LE / lumbar: self care, mobility, lifting and ambulation. [] UE / Cervical: self care, reaching, carrying, lifting, house/yardwork, driving, computer work. Modalities:  [] (24527) Vasopneumatic compression: Utilized vasopneumatic compression to decrease edema / swelling for the purpose of improving mobility and quad tone / recruitment which will allow for increased overall function including but not limited to self-care, transfers, ambulation, and ascending / descending stairs. Charges:  Timed Code Treatment Minutes: 15   Total Treatment Minutes: 60     [x] EVAL - LOW (13722)   [] EVAL - MOD (40440)  [] EVAL - HIGH (46192)  [] RE-EVAL (77637)  [] MP(70104) x       [] Ionto  [] NMR (64941) x       [] Vaso  [] Manual (30386) x       [] Ultrasound  [x] TA x 1        [] Mech Traction (47653)  [] Aquatic Therapy x     [] ES (un) (16293):   [] Home Management Training x  [] ES(attended) (23288)   [] Dry Needling 1-2 muscles (39050):  [] Dry Needling 3+ muscles (385921  [] Group:      [] Other:     GOALS:   Patient stated goal: \"get water off me\"  []? Progressing: []? Met: []? Not Met: []? Adjusted     Therapist goals for Patient:   Short Term Goals: To be achieved in: 2 weeks  1. Independent in HEP and progression per patient tolerance, in order to prevent return of swelling   []? Progressing: []? Met: []? Not Met: []? Adjusted  2. Patient will have a decrease in swelling/pain to facilitate improvement in movement, function, and ADLs as indicated by improvement with FOTO outcome measure. []? Progressing: []? Met: []? Not Met: []? Adjusted     Long Term Goals: To be achieved in: 6 weeks  1.  Disability index score of 80 or higher on FOTO outcome assessment to assist with reaching prior level of function. []? Progressing: []? Met: []? Not Met: []? Adjusted  2. Pt will report ability to walk >1 mile without limitations to assist with job activities with [employer]. []? Progressing: []? Met: []? Not Met: []? Adjusted  3. Decrease swelling of RLE by 30 cm so that pt can return to functional activities including ambulation without increased symptoms or restriction. []? Progressing: []? Met: []? Not Met: []? Adjusted  4. Decrease swelling of LLE by 20 cm so that pt can return to functional activities including ambulation without increased symptoms or restriction. []? Progressing: []? Met: []? Not Met: []? Adjusted    Overall Progression Towards Functional goals/ Treatment Progress Update:  [] Patient is progressing as expected towards functional goals listed. [] Progression is slowed due to complexities/Impairments listed. [] Progression has been slowed due to co-morbidities. [x] Plan just implemented, too soon to assess goals progression <30days   [] Goals require adjustment due to lack of progress  [] Patient is not progressing as expected and requires additional follow up with physician  [] Other    Persisting Functional Limitations/Impairments:  []Sleeping []Sitting               [x]Standing []Transfers        [x]Walking []Kneeling               []Stairs []Squatting / bending   [x]ADLs []Reaching  []Lifting  []Housework  []Driving []Job related tasks  []Sports/Recreation []Other:        ASSESSMENT:  See eval  Treatment/Activity Tolerance:  [x] Patient able to complete tx [] Patient limited by fatigue  [] Patient limited by pain  [] Patient limited by other medical complications  [] Other:     Prognosis: [x] Good [] Fair  [] Poor    Patient Requires Follow-up: [x] Yes  [] No    Plan for next treatment session:   MLD, compression, HEP, pt education, lymph pump as appropriate, exercise to stimulate lymphatic flow    PLAN: See eval. PT 2x / week for 6 weeks.    [] Continue per plan of care [] Alter current plan (see comments)  [x] Plan of care initiated [] Hold pending MD visit [] Discharge    Electronically signed by: Tacos Baker, PT, DPT    Note: If patient does not return for scheduled/ recommended follow up visits, this note will serve as a discharge from care along with most recent update on progress.

## 2022-03-29 NOTE — PLAN OF CARE
harder in the cold weather. 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 ONSET: 2015     Does pt have primary lymphedema / lymphedema tarda? Family history of edema in feet     Current Level of Function: sex, playing sports (softball), walking, unable to put socks on independently without use of self made sock aid at home    Prior Level of Function: Prior to this injury / incident, pt was independent with ADLs and IADLs. Living Status: does not live alone  Occupation/School: recently retired from [employer], boy  [employer]      PAIN:  Pain Scale: 10/10 - L ankle   Easing factors: sit and rest  Provocative factors:  Standing for prolonged periods     Functional Outcome: FOTO - physical primary measure: 64/100, risk adjusted: 47 3/29/22    Precautions/ Contra-indications: DM (checks blood sugar weekly or when he is symptomatic), HTN   Latex Allergy:  [x]NO      []YES  Relevant Medical History:  [x] Patient history, allergies, meds reviewed. Medical chart reviewed. See intake form. Review Of Systems (ROS):  [x]Performed Review of systems (Integumentary, CardioPulmonary, Neurological) by intake and observation. Intake form has been scanned into medical record. Patient has been instructed to contact their primary care physician regarding ROS issues if not already being addressed at this time.       Co-morbidities/Complexities (which will affect course of rehabilitation):   []None        []Hx of COVID   Arthritic conditions   []Rheumatoid arthritis (M05.9)  []Osteoarthritis (M19.91)  []Gout   Cardiovascular conditions   [x]Hypertension (I10)  []Hyperlipidemia (E78.5)  []Angina pectoris (I20)  []Atherosclerosis (I70)  []Pacemaker  []Hx of CABG/stent/  cardiac surgeries   Musculoskeletal conditions   []Disc pathology   []Congenital spine pathologies   []Osteoporosis (M81.8)  []Osteopenia (M85.8)  []Scoliosis       Endocrine conditions   []Hypothyroid (E03.9)  []Hyperthyroid Gastrointestinal conditions []Constipation (G87.74)   Metabolic conditions   []Morbid obesity (E66.01)  [x]Diabetes type 1(E10.65) or 2 (E11.65)   []Neuropathy (G60.9)     Cardio/Pulmonary conditions   []Asthma (J45)  []Coughing   []COPD (J44.9)  []CHF  []A-fib   Psychological Disorders  []Anxiety (F41.9)  []Depression (F32.9)   []Other:   Developmental Disorders  []Autism (F84.0)  []CP (G80)  []Down Syndrome (Q90.9)  []Developmental delay     Neurological conditions  []Prior Stroke (I69.30)  []Parkinson's (G20)  []Encephalopathy (G93.40)  []MS (G35)  []Post-polio (G14)  []SCI  []TBI  []ALS Other conditions  []Fibromyalgia (M79.7)  []Vertigo  []Syncope  []Kidney Failure  []Cancer      []currently undergoing                treatment  []Pregnancy  []Incontinence   Prior surgeries  []involved limb  []previous spinal surgery  [] section birth  []hysterectomy  []bowel / bladder surgery  []other relevant surgeries   []Other:                OBJECTIVE:     Observation: Lobule R medial thigh     Pitting   [] none [] slightly [] moderate [] severe [x] brawny (does not indent)   Color    [x] dusky [] mottled [x] red streaks [] other:  Skin Texture   [x] rough  [x] dry   [] moist  [] normal  [] hyperkaratosis [] hyperplasia  [x] hyperpigmentation [] Elephantiasis  [] papillomas  [] Skin breakdown with lymphorrhea (weeping)  Skin Temperature   [x] normal [] cool  [] uneven [] warm [] hot  Edema Rebound*   [] quick [x] slow - R lower leg [x] fibrotic tissue - B distal calves and R medial thigh   *pressure applied x10 seconds    Signs of Constriction:  Condition of Nailbeds:   [x] discolored [] red  [] white [] swollen     Skin Breakdown (indicate size, location and number) [] Yes [x] No  Comments:  Fistulas (an abnormal passageway) [] Yes  [x] No  Comments:  Tinea (fungus) [] Yes [x] No  Comments:  Papilloma (benign tumor arising from an epithelial layer)  [] Yes [x] No  Comments:   Fibrotic areas [x] Yes [] No  Comments: L mid calf above ankle brace, R lower leg, lobule R medial thigh    Lymphorrhea (flow of lymph from a cut or ruptured lymph vessels): [] Yes [x] No  Comments:  Warts (a local growth of the outer layer of skin) [] Yes [x] No  Comments:  Ulcers  [] Yes [x] No  Comments:    SCARS: n/a    STEMMER SIGN: [] positive [x] negative    Stage of Lymphedema   [] Latency stage/Lymphangiopathy (Stage 0 / Prestage / Subclinical stage):   · No swelling  · Reduced transport capacity (TC)  · \"Normal\" tissue consistency  [] Stage 1 (reversible stage):  · Edema is soft (pitting)  · No secondary tissue changes  · Elevation reduces swelling  [x] Stage 2 (spontaneously irreversible stage)  · Lyphostatic fibrosis  · Hardening of the tissue (no pitting)  · Stemmer sign positive   · Frequent infections   [] Stage 3 (lymphostatic elephantiasis)  · Extreme increase in volume and tissue texture with typical skin changes (papillomas, deep skinfolds, etc.)  · Stemmer sign positive    GIRTH MEASUREMENT  (Tape on skin along anterior tibialis)    Lower Extremity Right (cm) Left  (cm)   Date     Measurements taken as follows: 0 cm at inf aspect of lateral malleolus and marked on    [] Ant aspect of LE    [x] Lateral aspect of LE    [] sheet 36.0 30.0             Metatarsal heads 23.9 25.0   10 Cm above inf aspect of  LATERAL MALLEOLUS 34.5 27.5   20 Cm above inf aspect of  LATERAL MALLEOLUS    40.2 38.1   30 Cm above  inf aspec of LATERAL MALLEOLUS  47.2 46.4   40 Cm above  inf aspect of  LATERAL MALLEOLUS 46.0 43.0   55 Cm above  inf aspect of LATERAL MALLEOLUS  64.6 (distal to lobule) 59.0   65 Cm above  inf aspect of LATERAL MALLEOLUS 91.0 72.5   70 Cm above  inf aspect of LATERAL MALLEOLUS 91.7 (proximal to lobule) 80.2             Total Girth 475.1 cm  421.7 cm        Classification for Lymphedema  [] Mild: < 3 cm differential between affected limb and unaffected limb  [] Moderate:  3 - 5 cm differential between affected limb and unaffected limb   [x] Severe:  5+ cm differential between affected limb and unaffected limb           Barriers to/and or personal factors that will affect rehab potential:              []Age  []Sex    []Smoker              []Motivation/Lack of Motivation                        [x]Co-Morbidities              []Cognitive Function, education/learning barriers              []Environmental, home barriers              []profession/work barriers  [x]past PT/medical experience  []other:    Falls Risk Assessment (30 days):   [x] Falls Risk assessed and no intervention required. [] Falls Risk assessed and Patient requires intervention due to being higher risk   TUG score (>12s at risk):     [] Falls education provided, including         ASSESSMENT: Pt presents with B LE lymphedema, R>L. Pt with lobule on R medial thigh. Pt with fibrosis in B lower legs and lobule, hyperpigmentation, and dry skin. Pt has below the knee circaids which he has been wearing for >5 yrs and basic lymphedema pump. Basic pump does not provide adequate coverage to lobule on medial, upper R thigh and would benefit from use of advanced lymphedema pump. Pt will benefit from OP PT with MLD, compression, skin care, and home program with addition of lymphedema pump to decrease edema and fibrotic tissue and reduce risk for infection to return to PLOF without limitations.      Functional Impairments:   [x] Noted increased girth of BLE, R>L  [x] Noted decreased health of skin at B lower legs and medial R thigh  and fibrotic tissue   []Decreased functional strength of   []Reduced balance/proprioceptive control    []other:  reduced functional ROM of    []other:  reduce functional strength of    []other: myofascial changes and pain at    [] Postural impairments:   []other:      Functional Activity Limitations (from functional questionnaire and intake)  [x]Reduced ability to use affected limb for ADLs/IADLs due to swelling causing symptoms of heaviness, skin tightness, pain  []Reduced ability to perform lifting, reaching, carrying tasks  []Reduced ability to wear his/her normal clothes/shoes due to swelling    []Reduced ability to tolerate prolonged functional positions  []Reduced ability or difficulty with changes of positions or transfers between positions  []Reduced ability to maintain good posture and demonstrate good body mechanics with sitting, bending, and lifting   []Reduced ability to sleep   [] Reduced ability or tolerance with driving and/or computer work   []Reduced ability to squat   []Reduced ability to forward bend   [x]Reduced ability to ambulate prolonged functional periods/distances/surfaces   []Reduced ability to ascend/descend stairs    []Reduced ability to tolerate any impact through UE or spine   []other:        Participation Restrictions   [x]Reduced participation in self care activities   []Reduced participation in home management activities   []Reduced participation in work activities   [x]Reduced participation in social activities. [x]Reduced participation in sport/recreational activities.       Prognosis/Rehab Potential:      []Excellent   [x]Good    []Fair   []Poor    Tolerance of evaluation/treatment:    []Excellent   [x]Good    []Fair   []Poor     Physical Therapy Evaluation Complexity Justification  [x] A history of present problem with:  [] no personal factors and/or comorbidities that impact the plan of care;  [x]1-2 personal factors and/or comorbidities that impact the plan of care  []3 personal factors and/or comorbidities that impact the plan of care  [x] An examination of body systems using standardized tests and measures addressing any of the following: body structures and functions (impairments), activity limitations, and/or participation restrictions;:  [x] a total of 1-2 or more elements   [] a total of 3 or more elements   [] a total of 4 or more elements   [x] A clinical presentation with:  [x] stable and/or uncomplicated characteristics   [] evolving clinical Disability index score of 80 or higher on FOTO outcome assessment to assist with reaching prior level of function. [] Progressing: [] Met: [] Not Met: [] Adjusted  2. Pt will report ability to walk >1 mile without limitations to assist with job activities with [employer]. [] Progressing: [] Met: [] Not Met: [] Adjusted  3. Decrease swelling of RLE by 30 cm so that pt can return to functional activities including ambulation without increased symptoms or restriction. [] Progressing: [] Met: [] Not Met: [] Adjusted  4. Decrease swelling of LLE by 20 cm so that pt can return to functional activities including ambulation without increased symptoms or restriction.    [] Progressing: [] Met: [] Not Met: [] Adjusted      Electronically signed by:  Laxmi Mario, PT, DPT

## 2022-04-04 ENCOUNTER — HOSPITAL ENCOUNTER (OUTPATIENT)
Dept: PHYSICAL THERAPY | Age: 67
Setting detail: THERAPIES SERIES
Discharge: HOME | End: 2022-04-04
Payer: COMMERCIAL

## 2022-04-04 PROCEDURE — 97140 MANUAL THERAPY 1/> REGIONS: CPT

## 2022-04-04 PROCEDURE — 97530 THERAPEUTIC ACTIVITIES: CPT

## 2022-04-04 PROCEDURE — 97016 VASOPNEUMATIC DEVICE THERAPY: CPT

## 2022-04-04 NOTE — FLOWSHEET NOTE
[address] [facility]  Phone: [phone]   Fax: [phone]    Physical Therapy Daily LYMPHEDEMA Treatment Note    Date:  2022    Patient Name:  Cyn Goldsmith    :  1955  MRN: [id number]  Medical/Treatment Diagnosis Information:  · Diagnosis: I89.0 Lymphedema  · Treatment Diagnosis: BLE lymphedema, R>L  Insurance/Certification information:  PT Insurance Information: [facility], no CP, NO unattended estim  Physician Information:  Referring Practitioner: Nacho Benson MD  Plan of care signed (Y/N): []  Yes [x]  No     Date of Patient follow up with Physician:     Functional scale:   FOTO - physical primary measure: 64/100, risk adjusted: 47    3/29/22    Progress Report: []  Yes  [x]  No     Date Range for reporting period:  Beginning - 3/29  Ending    Progress report due (10 Rx/or 30 days whichever is less): visit #10 or  (date)     Recertification due (POC duration/ or 90 days whichever is less): visit #12 or  (date)     Visit # Insurance Allowable Auth required? Date Range    Med nec []  Yes  [x]  No         Latex Allergy:  [x]NO      []YES  Preferred Language for Healthcare:   [x]English       []other:      Pain level:  5/10     SUBJECTIVE: 15 min late for appointment, wearing below the knee circaid style compression that appear to be well worn. Could benefit from thigh high circaid style with lobule piece to address lobule on right inner thigh as well as new lower leg compression. Pain reported in B lower limbs. OBJECTIVE: : very dry skin, especially B feet and ankles. Noted hyperpigmentation bilaterally, worse on the right. Fibrosis noted at the right upper inner thigh lobule and to a lesser degree bilateral lower limbs. Patient uses silvadene on bilateral legs to assist with management of the dry skin.       RESTRICTIONS/PRECAUTIONS: DM (checks blood sugar weekly or when he is symptomatic), HTN Exercises/Interventions:     Therapeutic Exercises (54245) Resistance / level Sets/sec Reps Notes          Pt educated on exercises to stimulate lymphatic flow                                     Therapeutic Activities (85973)  (Dynamic activities such as compression, designed to improve functional performance)       Compression: trial tensoshape size   applied to        Multilayer compression bandaging to   Stockinette  Artiflex  Foam   cm low stretch compression bandage  cm low stretch compression bandage  cm low stretch compression bandage  cm low stretch compression bandage                                              Home Management  (providing pt education on safety procedures/instructions)       Pt educated on compression as follows:   how to appropriately apply and wear compression  how to maintain appropriate gradient compression  do not sleep with compression garment/tensoshape  signs and symptoms of constriction   when to remove compression       Pt educated on lymphedema prevention and management        Educated on skin care and using gentle non scented lotion   x     Discussed recommendation for chip pad over the right leg lobule and improved leg compression B with new garments.  Resource list provided  x                          Neuromuscular Re-ed (22675)                            Manual Intervention (95387)       See MLD flowchart below x                                           Manual Lymph Drainage (MLD):  MLD to B LEs, clearing along alternate pathway of  Ipsilateral trunk  to  Ipsilateral axillary  lymph nodes    Clear Nodes 10x each   Neck x   [address]    Axilla x   Abdomen x   Groin x   Popliteal x2 x   Clear Alternate Pathway 10x each   Re-clear alternate pathway   x5 each position x   Location Ipsilateral trunk        Fluid Mobilization 10x each   Re-clear alternate pathway   x5 each position x   Shoulder bracing    Location B LEs        Protein Resorption 10x each   Location Medial R thigh   B lower legs        Clear Foot/Ankle or Hand 10x each   Achilles x   Bilateral malleolus x   Fan the cards x   Clear dorsum x   Clear through web space x   Clear toes/fingers x   Fluid mobilization x   Re-clear all positions  X5 each x           Modalities: 4/4: advanced pneumatic Lymphedema pump B LE x 45 min at normal pressure    OTHER: 3/29 pt signed release of medical information form for [employer] to obtain advanced home lymphedema pump     Pt education:   3/29  Pt educated on pathology and anatomy of etiology of lymphedema, condition precautions, indications for long term prognosis. Pt was educated on diagnosis; prognosis; PT POC including MLD, compression (role of multilayer bandaging/ compression garments), lymphedema management/prevention of flare ups, role of exercise, HEP, lymphedema pump; expectations for rehab. All pt questions were answered. HEP instruction:  3/29 Pt instructed in lymphedema management/prevention concepts. Bring circaids NV. Moisturize daily with gentle nonscented lotion. Therapeutic Exercise and NMR EXR  [] (22165) Provided verbal/tactile cueing for activities related to strengthening, flexibility, endurance, ROM for improvements in  [] LE / Lumbar: LE, proximal hip, and core control with self care, mobility, lifting, ambulation. [] UE / Cervical: cervical, postural, scapular, scapulothoracic and UE control with self care, reaching, carrying, lifting, house/yardwork, driving, computer work.  [] (76679) Provided verbal/tactile cueing for activities related to improving balance, coordination, kinesthetic sense, posture, motor skill, proprioception to assist with   [] LE / lumbar: LE, proximal hip, and core control in self care, mobility, lifting, ambulation and eccentric single leg control. [] UE / cervical: cervical, scapular, scapulothoracic and UE control with self care, reaching, carrying, lifting, house/yardwork, driving, computer work.    [] (54887) Therapist is in constant attendance of 2 or more patients providing skilled therapy interventions, but not providing any significant amount of measurable one-on-one time to either patient, for improvements in  [] LE / lumbar: LE, proximal hip, and core control in self care, mobility, lifting, ambulation and eccentric single leg control. [] UE / cervical: cervical, scapular, scapulothoracic and UE control with self care, reaching, carrying, lifting, house/yardwork, driving, computer work. NMR and Therapeutic Activities:    [x] (56900 or 72379) Provided verbal/tactile cueing for activities related to improving balance, coordination, kinesthetic sense, posture, motor skill, proprioception and motor activation to allow for proper function of   [x] LE: / Lumbar core, proximal hip and LE with self care and ADLs  [] UE / Cervical: cervical, postural, scapular, scapulothoracic and UE control with self care, carrying, lifting, driving, computer work.   [] (64610) Gait Re-education- Provided training and instruction to the patient for proper LE, core and proximal hip recruitment and positioning and eccentric body weight control with ambulation re-education including up and down stairs     Home Management Training / Self Care:  [] (45015) Provided self-care/home management training related to activities of daily living and compensatory training, and/or use of adaptive equipment for improvement with: ADLs and compensatory training, meal preparation, safety procedures and instruction in use of adaptive equipment, including bathing, grooming, dressing, personal hygiene, basic household cleaning and chores.      Home Exercise Program:    [x] (67837) Reviewed/Progressed HEP activities related to strengthening, flexibility, endurance, ROM of   [] LE / Lumbar: core, proximal hip and LE for functional self-care, mobility, lifting and ambulation/stair navigation   [] UE / Cervical: cervical, postural, scapular, scapulothoracic and UE control with self care, reaching, carrying, lifting, house/yardwork, driving, computer work  [] (44406)Reviewed/Progressed HEP activities related to improving balance, coordination, kinesthetic sense, posture, motor skill, proprioception of   [] LE: core, proximal hip and LE for self care, mobility, lifting, and ambulation/stair navigation    [] UE / Cervical: cervical, postural,  scapular, scapulothoracic and UE control with self care, reaching, carrying, lifting, house/yardwork, driving, computer work    Manual Treatments:  PROM / STM / Oscillations-Mobs:  G-I, II, III, IV (PA's, Inf., Post.)  [x] (89219) Provided manual therapy to mobilize LE, proximal hip and/or LS spine soft tissue/joints for the purpose of modulating pain, promoting relaxation,  increasing ROM, reducing/eliminating soft tissue swelling/inflammation/restriction, improving soft tissue extensibility and allowing for proper ROM for normal function with   [x] LE / lumbar: self care, mobility, lifting and ambulation. [] UE / Cervical: self care, reaching, carrying, lifting, house/yardwork, driving, computer work. Modalities:  [x] (51967) Vasopneumatic compression: Utilized vasopneumatic compression to decrease edema / swelling for the purpose of improving mobility and quad tone / recruitment which will allow for increased overall function including but not limited to self-care, transfers, ambulation, and ascending / descending stairs.        Charges:  Timed Code Treatment Minutes: 60   Total Treatment Minutes: 120     [] EVAL - LOW (05085)   [] EVAL - MOD (72219)  [] EVAL - HIGH (58838)  [] RE-EVAL (11946)  [] KD(36804) x       [] Ionto  [] NMR (16596) x       [x] Vaso  [x] Manual (08727) x 4      [] Ultrasound  [x] TA x   1     [] Mech Traction (54066)  [] Aquatic Therapy x     [] ES (un) (72052):   [] Home Management Training x  [] ES(attended) (19645)   [] Dry Needling 1-2 muscles (64396):  [] Dry Needling 3+ muscles (034416  [] Group:      [] Other:     GOALS:   Patient stated goal: \"get water off me\"  []? Progressing: []? Met: []? Not Met: []? Adjusted     Therapist goals for Patient:   Short Term Goals: To be achieved in: 2 weeks  1. Independent in HEP and progression per patient tolerance, in order to prevent return of swelling   []? Progressing: []? Met: []? Not Met: []? Adjusted  2. Patient will have a decrease in swelling/pain to facilitate improvement in movement, function, and ADLs as indicated by improvement with FOTO outcome measure. []? Progressing: []? Met: []? Not Met: []? Adjusted     Long Term Goals: To be achieved in: 6 weeks  1. Disability index score of 80 or higher on FOTO outcome assessment to assist with reaching prior level of function. []? Progressing: []? Met: []? Not Met: []? Adjusted  2. Pt will report ability to walk >1 mile without limitations to assist with job activities with [employer]. []? Progressing: []? Met: []? Not Met: []? Adjusted  3. Decrease swelling of RLE by 30 cm so that pt can return to functional activities including ambulation without increased symptoms or restriction. []? Progressing: []? Met: []? Not Met: []? Adjusted  4. Decrease swelling of LLE by 20 cm so that pt can return to functional activities including ambulation without increased symptoms or restriction. []? Progressing: []? Met: []? Not Met: []? Adjusted    Overall Progression Towards Functional goals/ Treatment Progress Update:  [] Patient is progressing as expected towards functional goals listed. [] Progression is slowed due to complexities/Impairments listed. [] Progression has been slowed due to co-morbidities.   [x] Plan just implemented, too soon to assess goals progression <30days   [] Goals require adjustment due to lack of progress  [] Patient is not progressing as expected and requires additional follow up with physician  [] Other    Persisting Functional Limitations/Impairments:  []Sleeping []Sitting               [x]Standing []Transfers        [x]Walking []Kneeling               []Stairs []Squatting / bending   [x]ADLs []Reaching  []Lifting  []Housework  []Driving []Job related tasks  []Sports/Recreation []Other:        ASSESSMENT:  See eval  Treatment/Activity Tolerance:  [x] Patient able to complete tx [] Patient limited by fatigue  [] Patient limited by pain  [] Patient limited by other medical complications  [] Other:     Prognosis: [x] Good [] Fair  [] Poor    Patient Requires Follow-up: [x] Yes  [] No    Plan for next treatment session:   MLD, compression, HEP, pt education, lymph pump as appropriate, exercise to stimulate lymphatic flow    PLAN: See eval. PT 2x / week for 6 weeks. [] Continue per plan of care [] Alter current plan (see comments)  [x] Plan of care initiated [] Hold pending MD visit [] Discharge    Electronically signed by: Morteza Bose, PT, DPT    Note: If patient does not return for scheduled/ recommended follow up visits, this note will serve as a discharge from care along with most recent update on progress.

## 2022-04-06 ENCOUNTER — HOSPITAL ENCOUNTER (OUTPATIENT)
Dept: PHYSICAL THERAPY | Age: 67
Setting detail: THERAPIES SERIES
Discharge: HOME | End: 2022-04-06
Payer: COMMERCIAL

## 2022-04-06 PROCEDURE — 97016 VASOPNEUMATIC DEVICE THERAPY: CPT

## 2022-04-06 PROCEDURE — 97530 THERAPEUTIC ACTIVITIES: CPT

## 2022-04-06 PROCEDURE — 97140 MANUAL THERAPY 1/> REGIONS: CPT

## 2022-04-06 NOTE — FLOWSHEET NOTE
[facility]  Phone: [phone]   Fax: [phone]    Physical Therapy Daily LYMPHEDEMA Treatment Note    Date:  2022    Patient Name:  Nii Jimenez    :  1955  MRN: [id number]  Medical/Treatment Diagnosis Information:  · Diagnosis: I89.0 Lymphedema  · Treatment Diagnosis: BLE lymphedema, R>L  Insurance/Certification information:  PT Insurance Information: [facility] [address], no CP, NO unattended estim  Physician Information:  Referring Practitioner: Jarret Serrano MD  Plan of care signed (Y/N): []  Yes [x]  No --resent 2022    Date of Patient follow up with Physician:     Functional scale:   [facility] - physical primary measure: 64/100, risk adjusted: 47    3/29/22    Progress Report: []  Yes  [x]  No    Date Range for reporting period:  Beginning  3/29  Ending     Progress report due (10 Rx/or 30 days whichever is less): visit #10 or  (date)     Recertification due (POC duration/ or 90 days whichever is less): visit #12 or  (date)     Visit # Insurance Allowable Auth required? Date Range   3/12 Med nec []  Yes  [x]  No         Latex Allergy:  [x]NO      []YES  Preferred Language for Healthcare:   [x]English       []other:      Pain level:  5/10     SUBJECTIVE: : 10 min late for appointment, wearing below the knee circaid style compression that appear to be well worn. Magaly from [employer] present for session to discuss home pump; patient could benefit from advanced pneumatic pump to address existing swelling in B LE and groin/abdomen. Patient verbalizing concern about his ability to obtain the unit due to financial constraints. OBJECTIVE: : [employer] able to document reduced limb girth in all locations including hips and abdomen following 30 min session on advanced pump today  : very dry skin, especially B feet and ankles. Noted hyperpigmentation bilaterally, worse on the right.  Fibrosis noted at the right upper inner thigh lobule and to a lesser degree bilateral lower limbs. Patient uses silvadene on bilateral legs to assist with management of the dry skin. RESTRICTIONS/PRECAUTIONS: DM (checks blood sugar weekly or when he is symptomatic), HTN     Exercises/Interventions:     Therapeutic Exercises (77332) Resistance / level Sets/sec Reps Notes          Pt educated on exercises to stimulate lymphatic flow     x Reviewed importance of exercise with compression as well as regular elevation of B LE to help minimize swelling                               Therapeutic Activities (19992)  (Dynamic activities such as compression, designed to improve functional performance)       Compression: trial tensoshape size   applied to        Multilayer compression bandaging to   Stockinette  Artiflex  Foam   cm low stretch compression bandage  cm low stretch compression bandage  cm low stretch compression bandage  cm low stretch compression bandage                                              Home Management  (providing pt education on safety procedures/instructions)       Pt educated on compression as follows:   how to appropriately apply and wear compression  how to maintain appropriate gradient compression  do not sleep with compression garment/tensoshape  signs and symptoms of constriction   when to remove compression       Pt educated on lymphedema prevention and management        Educated on skin care and using gentle non scented lotion       Discussed recommendation for chip pad over the right leg lobule and improved leg compression B with new garments.  Resource list provided                           Neuromuscular Re-ed (93809)                            Manual Intervention (19453)       See MLD flowchart below x      [employer] trial of advanced pneumatic pump with abdomen/groin piece as well as LE piece x                                    Manual Lymph Drainage (MLD):  MLD to B LEs, clearing along alternate pathway of  Ipsilateral trunk  to  Ipsilateral axillary  lymph nodes    Clear Nodes 10x each   Neck x   [address]    Axilla x   Abdomen x   Groin x   Popliteal x2 x   Clear Alternate Pathway 10x each   Re-clear alternate pathway   x5 each position x   Location Ipsilateral trunk        Fluid Mobilization 10x each   Re-clear alternate pathway   x5 each position x   Shoulder bracing    Location B LEs        Protein Resorption 10x each   Location Medial R thigh   B lower legs        Clear Foot/Ankle or Hand 10x each   Achilles x   Bilateral malleolus x   Fan the cards x   Clear dorsum x   Clear through web space x   Clear toes/fingers x   Fluid mobilization x   Re-clear all positions  X5 each x           Modalities: 4/6: advanced pneumatic Lymphedema pump R LE and groin/abdomen x 30 min at normal pressure; no complaints of pain or discomfort; patient reporting that it feels more comfortable on his leg than his current home pump. OTHER: 3/29 pt signed release of medical information form for [facility] medical to obtain advanced home lymphedema pump     Pt education:   3/29  Pt educated on pathology and anatomy of etiology of lymphedema, condition precautions, indications for long term prognosis. Pt was educated on diagnosis; prognosis; PT POC including MLD, compression (role of multilayer bandaging/ compression garments), lymphedema management/prevention of flare ups, role of exercise, HEP, lymphedema pump; expectations for rehab. All pt questions were answered. HEP instruction:  3/29 Pt instructed in lymphedema management/prevention concepts. Bring circaids NV. Moisturize daily with gentle nonscented lotion. Therapeutic Exercise and NMR EXR  [] (22933) Provided verbal/tactile cueing for activities related to strengthening, flexibility, endurance, ROM for improvements in  [] LE / Lumbar: LE, proximal hip, and core control with self care, mobility, lifting, ambulation.   [] UE / Cervical: cervical, postural, scapular, scapulothoracic and UE control with self care, reaching, carrying, lifting, house/yardwork, driving, computer work.  [] (88320) Provided verbal/tactile cueing for activities related to improving balance, coordination, kinesthetic sense, posture, motor skill, proprioception to assist with   [] LE / lumbar: LE, proximal hip, and core control in self care, mobility, lifting, ambulation and eccentric single leg control. [] UE / cervical: cervical, scapular, scapulothoracic and UE control with self care, reaching, carrying, lifting, house/yardwork, driving, computer work.   [] (65763) Therapist is in constant attendance of 2 or more patients providing skilled therapy interventions, but not providing any significant amount of measurable one-on-one time to either patient, for improvements in  [] LE / lumbar: LE, proximal hip, and core control in self care, mobility, lifting, ambulation and eccentric single leg control. [] UE / cervical: cervical, scapular, scapulothoracic and UE control with self care, reaching, carrying, lifting, house/yardwork, driving, computer work.      NMR and Therapeutic Activities:    [x] (28664 or 47728) Provided verbal/tactile cueing for activities related to improving balance, coordination, kinesthetic sense, posture, motor skill, proprioception and motor activation to allow for proper function of   [x] LE: / Lumbar core, proximal hip and LE with self care and ADLs  [] UE / Cervical: cervical, postural, scapular, scapulothoracic and UE control with self care, carrying, lifting, driving, computer work.   [] (09226) Gait Re-education- Provided training and instruction to the patient for proper LE, core and proximal hip recruitment and positioning and eccentric body weight control with ambulation re-education including up and down stairs     Home Management Training / Self Care:  [] (93684) Provided self-care/home management training related to activities of daily living and compensatory training, and/or use of adaptive equipment for improvement with: ADLs and compensatory training, meal preparation, safety procedures and instruction in use of adaptive equipment, including bathing, grooming, dressing, personal hygiene, basic household cleaning and chores. Home Exercise Program:    [x] (50582) Reviewed/Progressed HEP activities related to strengthening, flexibility, endurance, ROM of   [] LE / Lumbar: core, proximal hip and LE for functional self-care, mobility, lifting and ambulation/stair navigation   [] UE / Cervical: cervical, postural, scapular, scapulothoracic and UE control with self care, reaching, carrying, lifting, house/yardwork, driving, computer work  [] (44802)Reviewed/Progressed HEP activities related to improving balance, coordination, kinesthetic sense, posture, motor skill, proprioception of   [] LE: core, proximal hip and LE for self care, mobility, lifting, and ambulation/stair navigation    [] UE / Cervical: cervical, postural,  scapular, scapulothoracic and UE control with self care, reaching, carrying, lifting, house/yardwork, driving, computer work    Manual Treatments:  PROM / STM / Oscillations-Mobs:  G-I, II, III, IV (PA's, Inf., Post.)  [x] (51140) Provided manual therapy to mobilize LE, proximal hip and/or LS spine soft tissue/joints for the purpose of modulating pain, promoting relaxation,  increasing ROM, reducing/eliminating soft tissue swelling/inflammation/restriction, improving soft tissue extensibility and allowing for proper ROM for normal function with   [x] LE / lumbar: self care, mobility, lifting and ambulation. [] UE / Cervical: self care, reaching, carrying, lifting, house/yardwork, driving, computer work.      Modalities:  [x] (05439) Vasopneumatic compression: Utilized vasopneumatic compression to decrease edema / swelling for the purpose of improving mobility and quad tone / recruitment which will allow for increased overall function including but not limited to self-care, transfers, ambulation, and ascending / descending stairs. Charges:  Timed Code Treatment Minutes: 90   Total Treatment Minutes: 90     [] EVAL - LOW (89122)   [] EVAL - MOD (29924)  [] EVAL - HIGH (55618)  [] RE-EVAL (19948)  [] AD(81292) x       [] Ionto  [] NMR (06924) x       [x] Vaso  [x] Manual (20122) x 2      [] Ultrasound  [x] TA x  2      [] Mech Traction (98166)  [] Aquatic Therapy x     [] ES (un) (47367):   [] Home Management Training x  [] ES(attended) (26192)   [] Dry Needling 1-2 muscles (92136):  [] Dry Needling 3+ muscles (785986  [] Group:      [] Other:     GOALS:   Patient stated goal: \"get water off me\"  []? Progressing: []? Met: []? Not Met: []? Adjusted     Therapist goals for Patient:   Short Term Goals: To be achieved in: 2 weeks  1. Independent in HEP and progression per patient tolerance, in order to prevent return of swelling   []? Progressing: []? Met: []? Not Met: []? Adjusted  2. Patient will have a decrease in swelling/pain to facilitate improvement in movement, function, and ADLs as indicated by improvement with FOTO outcome measure. []? Progressing: []? Met: []? Not Met: []? Adjusted     Long Term Goals: To be achieved in: 6 weeks  1. Disability index score of 80 or higher on FOTO outcome assessment to assist with reaching prior level of function. []? Progressing: []? Met: []? Not Met: []? Adjusted  2. Pt will report ability to walk >1 mile without limitations to assist with job activities with [employer]. []? Progressing: []? Met: []? Not Met: []? Adjusted  3. Decrease swelling of RLE by 30 cm so that pt can return to functional activities including ambulation without increased symptoms or restriction. []? Progressing: []? Met: []? Not Met: []? Adjusted  4. Decrease swelling of LLE by 20 cm so that pt can return to functional activities including ambulation without increased symptoms or restriction. []? Progressing: []?  Met: []? Not Met: []? Adjusted    Overall Progression Towards Functional goals/ Treatment Progress Update:  [] Patient is progressing as expected towards functional goals listed. [] Progression is slowed due to complexities/Impairments listed. [] Progression has been slowed due to co-morbidities. [x] Plan just implemented, too soon to assess goals progression <30days   [] Goals require adjustment due to lack of progress  [] Patient is not progressing as expected and requires additional follow up with physician  [] Other    Persisting Functional Limitations/Impairments:  []Sleeping []Sitting               [x]Standing []Transfers        [x]Walking []Kneeling               []Stairs []Squatting / bending   [x]ADLs []Reaching  []Lifting  []Housework  []Driving []Job related tasks  []Sports/Recreation []Other:        ASSESSMENT: responding well to advanced pneumatic pump; has not yet obtained new garments as advised last visit, but is wearing his compression daily and is elevating legs as often as possible throughout the day  Treatment/Activity Tolerance:  [x] Patient able to complete tx [] Patient limited by fatigue  [] Patient limited by pain  [] Patient limited by other medical complications  [] Other:     Prognosis: [x] Good [] Fair  [] Poor    Patient Requires Follow-up: [x] Yes  [] No    Plan for next treatment session:   MLD, compression, HEP, pt education, lymph pump as appropriate, exercise to stimulate lymphatic flow    PLAN: See eval. PT 2x / week for 6 weeks. [x] Continue per plan of care [] Alter current plan (see comments)  [] Plan of care initiated [] Hold pending MD visit [] Discharge    Electronically signed by: Leopoldo Barns, PT, DPT    Note: If patient does not return for scheduled/ recommended follow up visits, this note will serve as a discharge from care along with most recent update on progress.

## 2022-04-11 ENCOUNTER — HOSPITAL ENCOUNTER (OUTPATIENT)
Dept: PHYSICAL THERAPY | Age: 67
Setting detail: THERAPIES SERIES
Discharge: HOME | End: 2022-04-11
Payer: COMMERCIAL

## 2022-04-11 PROCEDURE — 97530 THERAPEUTIC ACTIVITIES: CPT

## 2022-04-11 PROCEDURE — 97140 MANUAL THERAPY 1/> REGIONS: CPT

## 2022-04-11 PROCEDURE — 97016 VASOPNEUMATIC DEVICE THERAPY: CPT

## 2022-04-11 NOTE — FLOWSHEET NOTE
[facility]  Phone: [phone]   Fax: [phone]    Physical Therapy Daily LYMPHEDEMA Treatment Note    Date:  2022    Patient Name:  Lurdes Cervantes    :  1955  MRN: [id number]  Medical/Treatment Diagnosis Information:  · Diagnosis: I89.0 Lymphedema  · Treatment Diagnosis: BLE lymphedema, R>L  Insurance/Certification information:  PT Insurance Information: [facility] [employer] [address], no CP, NO unattended estim  Physician Information:  Referring Practitioner: Enoc Garcia MD  Plan of care signed (Y/N): []  Yes [x]  No --faxed on ; initial cert re-faxed: 1643,     Date of Patient follow up with Physician:     Functional scale:   [address] - physical primary measure: 64/100, risk adjusted: 47    3/29/22    Progress Report: []  Yes  [x]  No    Date Range for reporting period:  Beginning  3/29  Ending     Progress report due (10 Rx/or 30 days whichever is less): visit #10 or  (date)     Recertification due (POC duration/ or 90 days whichever is less): visit #12 or  (date)     Visit # Insurance Allowable Auth required? Date Range    Med nec []  Yes  [x]  No         Latex Allergy:  [x]NO      []YES  Preferred Language for Healthcare:   [x]English       []other:      Pain level: 9/10 R LE, L LE 12/10     SUBJECTIVE:  attributes increased pain to the weather changes. State weather changes always make his swelling worse. States he really liked the pump - not sure if he can afford it. Uses hemp lotion on his legs for pain control. Has appt at [facility] tomorrow to get circaids for thighs and a chip pad for R inner thigh    : 10 min late for appointment, wearing below the knee circaid style compression that appear to be well worn. Magaly from [employer] present for session to discuss home pump; patient could benefit from advanced pneumatic pump to address existing swelling in B LE and groin/abdomen. Patient verbalizing concern about his ability to obtain the unit due to financial constraints. OBJECTIVE: 4/11 pt 30 min late to appt - he attributes this to had to go to bathroom and couldn't find the clinic phone number. PT gave pt a business card with #  Fibrosis R medial thigh, distal LE B  Skin: significant flakey skin that falls off LE when pt changes stockinettes. Hemosidran staining distal LE B     4/6: [employer] able to document reduced limb girth in all locations including hips and abdomen following 30 min session on advanced pump today  4/4: very dry skin, especially B feet and ankles. Noted hyperpigmentation bilaterally, worse on the right. Fibrosis noted at the right upper inner thigh lobule and to a lesser degree bilateral lower limbs. Patient uses silvadene on bilateral legs to assist with management of the dry skin. RESTRICTIONS/PRECAUTIONS: DM (checks blood sugar weekly or when he is symptomatic), HTN     Exercises/Interventions:     Therapeutic Exercises (37144) Resistance / level Sets/sec Reps Notes          Pt educated on exercises to stimulate lymphatic flow     x Reviewed importance of exercise with compression as well as regular elevation of B LE to help minimize swelling                               Therapeutic Activities (88302)  (Dynamic activities such as compression, designed to improve functional performance)       Compression: trial tensoshape size   applied to        Multilayer compression bandaging to   Stockinette  Artiflex  Foam   cm low stretch compression bandage  cm low stretch compression bandage  cm low stretch compression bandage  cm low stretch compression bandage     Applied foam rectangles to fibrotic areas B distal LE under circaids.  Instructed pt how to do at home                                         Home Management  (providing pt education on safety procedures/instructions)       Pt educated on compression as follows:   how to appropriately apply and wear compression  how to maintain appropriate gradient compression  do not sleep with compression garment/tensoshape  signs and symptoms of constriction   when to remove compression Instructed pt re approp gradient compression - when and how to adjust circaid and foam, when to remove      Pt educated on lymphedema prevention and management        Educated on skin care and using gentle non scented lotion   Review     Discussed recommendation for chip pad over the right leg lobule and improved leg compression B with new garments. Resource list provided  Review                          Neuromuscular Re-ed (94207)                            Manual Intervention (00071)       See MLD flowchart below 45'      [employer] trial of advanced pneumatic pump with abdomen/groin piece as well as LE piece 4/6/22                                    Manual Lymph Drainage (MLD):  MLD to B LEs, clearing along alternate pathway of  Ipsilateral trunk  to  Ipsilateral axillary  lymph nodes    Clear Nodes 10x each   Neck x   [address]    Axilla x   Abdomen x   Groin x   Popliteal x2 x   Clear Alternate Pathway 10x each   Re-clear alternate pathway   x5 each position x   Location Ipsilateral trunk        Fluid Mobilization 10x each   Re-clear alternate pathway   x5 each position x   Shoulder bracing    Location B LEs        Protein Resorption 10x each   Location Medial R thigh   B lower legs        Clear Foot/Ankle or Hand 10x each   Achilles x   Bilateral malleolus x   Fan the cards x   Clear dorsum x   Clear through web space x   Clear toes/fingers x   Fluid mobilization x   Re-clear all positions  X5 each x           Modalities:   4/11 lymph pump B LE during MLD of trunk and opp LE - 20 min ea LE.   4/6: advanced pneumatic Lymphedema pump R LE and groin/abdomen x 30 min at normal pressure; no complaints of pain or discomfort; patient reporting that it feels more comfortable on his leg than his current home pump.     OTHER: 3/29 pt signed release of medical information form for [employer] to obtain advanced home lymphedema pump     Pt education:   4/11 instruct pt in how to do self MLD swipe method, clearing B LE  to ipsilateral axillary lymph nodes. Instruct in use of chip pad/foam under circaids, instruct in skin care  3/29  Pt educated on pathology and anatomy of etiology of lymphedema, condition precautions, indications for long term prognosis. Pt was educated on diagnosis; prognosis; PT POC including MLD, compression (role of multilayer bandaging/ compression garments), lymphedema management/prevention of flare ups, role of exercise, HEP, lymphedema pump; expectations for rehab. All pt questions were answered. HEP instruction:  4/11 instructed pt in swipe method of MLD clearing B LE  to ipsilateral axillary lymph nodes. Instruct in use of chip pad/foam under circaids  3/29 Pt instructed in lymphedema management/prevention concepts. Bring circaids NV. Moisturize daily with gentle nonscented lotion. Therapeutic Exercise and NMR EXR  [] (60958) Provided verbal/[employer] cueing for activities related to strengthening, flexibility, endurance, ROM for improvements in  [] LE / Lumbar: LE, proximal hip, and core control with self care, mobility, lifting, ambulation. [] UE / Cervical: cervical, postural, scapular, scapulothoracic and UE control with self care, reaching, carrying, lifting, house/yardwork, driving, computer work.  [] (39379) Provided verbal/[employer] cueing for activities related to improving balance, coordination, kinesthetic sense, posture, motor skill, proprioception to assist with   [] LE / lumbar: LE, proximal hip, and core control in self care, mobility, lifting, ambulation and eccentric single leg control.    [] UE / cervical: cervical, scapular, scapulothoracic and UE control with self care, reaching, carrying, lifting, house/yardwork, driving, computer work.   [] (45044) Therapist is in constant attendance of 2 or more patients providing skilled therapy interventions, but not providing any significant amount of measurable one-on-one time to either patient, for improvements in  [] LE / lumbar: LE, proximal hip, and core control in self care, mobility, lifting, ambulation and eccentric single leg control. [] UE / cervical: cervical, scapular, scapulothoracic and UE control with self care, reaching, carrying, lifting, house/yardwork, driving, computer work. NMR and Therapeutic Activities:    [x] (65991 or 52593) Provided verbal/[employer] cueing for activities related to improving balance, coordination, kinesthetic sense, posture, motor skill, proprioception and motor activation to allow for proper function of   [x] LE: / Lumbar core, proximal hip and LE with self care and ADLs  [] UE / Cervical: cervical, postural, scapular, scapulothoracic and UE control with self care, carrying, lifting, driving, computer work.   [] (00608) Gait Re-education- Provided training and instruction to the patient for proper LE, core and proximal hip recruitment and positioning and eccentric body weight control with ambulation re-education including up and down stairs     Home Management Training / Self Care:  [] (54830) Provided self-care/home management training related to activities of daily living and compensatory training, and/or use of adaptive equipment for improvement with: ADLs and compensatory training, meal preparation, safety procedures and instruction in use of adaptive equipment, including bathing, grooming, dressing, personal hygiene, basic household cleaning and chores.      Home Exercise Program:    [x] (93814) Reviewed/Progressed HEP activities related to strengthening, flexibility, endurance, ROM of   [] LE / Lumbar: core, proximal hip and LE for functional self-care, mobility, lifting and ambulation/stair navigation   [] UE / Cervical: cervical, postural, scapular, scapulothoracic and UE control with self care, reaching, carrying, lifting, house/yardwork, driving, computer work  [] (12458)Reviewed/Progressed HEP activities related to improving balance, coordination, kinesthetic sense, posture, motor skill, proprioception of   [] LE: core, proximal hip and LE for self care, mobility, lifting, and ambulation/stair navigation    [] UE / Cervical: cervical, postural,  scapular, scapulothoracic and UE control with self care, reaching, carrying, lifting, house/yardwork, driving, computer work    Manual Treatments:  PROM / STM / Oscillations-Mobs:  G-I, II, III, IV (PA's, Inf., Post.)  [x] (06058) Provided manual therapy to mobilize LE, proximal hip and/or LS spine soft tissue/joints for the purpose of modulating pain, promoting relaxation,  increasing ROM, reducing/eliminating soft tissue swelling/inflammation/restriction, improving soft tissue extensibility and allowing for proper ROM for normal function with   [x] LE / lumbar: self care, mobility, lifting and ambulation. [] UE / Cervical: self care, reaching, carrying, lifting, house/yardwork, driving, computer work. Modalities:  [x] (67008) Vasopneumatic compression: Utilized vasopneumatic compression to decrease edema / swelling for the purpose of improving mobility and quad tone / recruitment which will allow for increased overall function including but not limited to self-care, transfers, ambulation, and ascending / descending stairs.        Charges:  Timed Code Treatment Minutes: 60   Total Treatment Minutes: 75     [] EVAL - LOW (90656)   [] EVAL - MOD (28120)  [] EVAL - HIGH (43696)  [] RE-EVAL (62016)  [] QV(26809) x       [] Ionto  [] NMR (09980) x       [x] Vaso  [x] Manual (23025) x 3      [] Ultrasound  [x] TA x  1      [] Mech Traction (75404)  [] Aquatic Therapy x     [] ES (un) (89806):   [] Home Management Training x  [] ES(attended) (98380)   [] Dry Needling 1-2 muscles (74932):  [] Dry Needling 3+ muscles (482863  [] Group:      [] Other:     GOALS:   Patient stated goal: \"get water off me\"  []? Progressing: []? Met: []? Not Met: []? Adjusted     Therapist goals for Patient:   Short Term Goals: To be achieved in: 2 weeks  1. Independent in HEP and progression per patient tolerance, in order to prevent return of swelling   []? Progressing: []? Met: []? Not Met: []? Adjusted  2. Patient will have a decrease in swelling/pain to facilitate improvement in movement, function, and ADLs as indicated by improvement with FOTO outcome measure. []? Progressing: []? Met: []? Not Met: []? Adjusted     Long Term Goals: To be achieved in: 6 weeks  1. Disability index score of 80 or higher on FOTO outcome assessment to assist with reaching prior level of function. []? Progressing: []? Met: []? Not Met: []? Adjusted  2. Pt will report ability to walk >1 mile without limitations to assist with job activities with [employer]. []? Progressing: []? Met: []? Not Met: []? Adjusted  3. Decrease swelling of RLE by 30 cm so that pt can return to functional activities including ambulation without increased symptoms or restriction. []? Progressing: []? Met: []? Not Met: []? Adjusted  4. Decrease swelling of LLE by 20 cm so that pt can return to functional activities including ambulation without increased symptoms or restriction. []? Progressing: []? Met: []? Not Met: []? Adjusted    Overall Progression Towards Functional goals/ Treatment Progress Update:  [x] Patient is progressing as expected towards functional goals listed. [] Progression is slowed due to complexities/Impairments listed. [] Progression has been slowed due to co-morbidities.   [] Plan just implemented, too soon to assess goals progression <30days   [] Goals require adjustment due to lack of progress  [] Patient is not progressing as expected and requires additional follow up with physician  [] Other    Persisting Functional Limitations/Impairments:  []Sleeping []Sitting               [x]Standing []Transfers        [x]Walking []Kneeling               []Stairs []Squatting / bending   [x]ADLs []Reaching  []Lifting  []Housework  []Driving []Job related tasks  []Sports/Recreation []Other:        ASSESSMENT: 4/11 pt really likes the lymph pump and will benefit from use of advancedpump at home. Has appt to get circ aid for thigh tomorrow - will benefit from compression of R thigh lobule and will benefit from use of chip pads/foam cut outs to put over fibrotic areas whe wearing circaids. 4/6 responding well to advanced pneumatic pump; has not yet obtained new garments as advised last visit, but is wearing his compression daily and is elevating legs as often as possible throughout the day    Treatment/Activity Tolerance:  [x] Patient able to complete tx [] Patient limited by fatigue  [] Patient limited by pain  [] Patient limited by other medical complications  [] Other:     Prognosis: [x] Good [] Fair  [] Poor    Patient Requires Follow-up: [x] Yes  [] No    Plan for next treatment session:   MLD, compression, HEP, pt education, lymph pump as appropriate, exercise to stimulate lymphatic flow    PLAN: See eval. PT 2x / week for 6 weeks. [x] Continue per plan of care [] Alter current plan (see comments)  [] Plan of care initiated [] Hold pending MD visit [] Discharge    Electronically signed by: Racheal Maya, PT, DPT    Note: If patient does not return for scheduled/ recommended follow up visits, this note will serve as a discharge from care along with most recent update on progress.

## 2022-04-14 ENCOUNTER — HOSPITAL ENCOUNTER (OUTPATIENT)
Dept: PHYSICAL THERAPY | Age: 67
Setting detail: THERAPIES SERIES
Discharge: HOME | End: 2022-04-14
Payer: COMMERCIAL

## 2022-04-14 PROCEDURE — 97016 VASOPNEUMATIC DEVICE THERAPY: CPT

## 2022-04-14 PROCEDURE — 97140 MANUAL THERAPY 1/> REGIONS: CPT

## 2022-04-14 NOTE — FLOWSHEET NOTE
[address] [facility] Physical Therapy  Phone: [phone]   Fax: [phone]    Physical Therapy Daily LYMPHEDEMA Treatment Note    Date:  2022    Patient Name:  Jessie Lambert    :  1955  MRN: [id number]  Medical/Treatment Diagnosis Information:  · Diagnosis: I89.0 Lymphedema  · Treatment Diagnosis: BLE lymphedema, R>L  Insurance/Certification information:  PT Insurance Information: [facility], no CP, NO unattended estim  Physician Information:  Referring Practitioner: Daquan Tobar MD  Plan of care signed (Y/N): []  Yes [x]  No --faxed on 8/15/23; initial cert re-faxed: ,     Date of Patient follow up with Physician:     Functional scale:   [address] - physical primary measure: 64/100, risk adjusted: 47    3/29/22    Progress Report: []  Yes  [x]  No    Date Range for reporting period:  Beginning  3/29  Ending     Progress report due (10 Rx/or 30 days whichever is less): visit #10 or  (date)     Recertification due (POC duration/ or 90 days whichever is less): visit #12 or  (date)     Visit # Insurance Allowable Auth required? Date Range    Med nec []  Yes  [x]  No         Latex Allergy:  [x]NO      []YES  Preferred Language for Healthcare:   [x]English       []other:      Pain level: 9/10 R LE, L LE 12/10     SUBJECTIVE:    States he got fitted for circaids and they will call him when they get it figured out. Reports he is doing well today. States he has pain in his ankles.  attributes increased pain to the weather changes. State weather changes always make his swelling worse. States he really liked the pump - not sure if he can afford it. Uses hemp lotion on his legs for pain control. Has appt at [facility] tomorrow to get circaids for thighs and a chip pad for R inner thigh    : 10 min late for appointment, wearing below the knee circaid style compression that appear to be well worn.  Diamondmary Sinclair on compression as follows:   how to appropriately apply and wear compression  how to maintain appropriate gradient compression  do not sleep with compression garment/tensoshape  signs and symptoms of constriction   when to remove compression I      Pt educated on lymphedema prevention and management        Educated on skin care and using gentle non scented lotion   Review     Discussed recommendation for chip pad over the right leg lobule and improved leg compression B with new garments.  Resource list provided  Review                          Neuromuscular Re-ed (83528)                            Manual Intervention (71191)       See MLD flowchart below 45'      Tactile trial of advanced pneumatic pump with abdomen/groin piece as well as LE piece 4/6/22                                    Manual Lymph Drainage (MLD):  MLD to B LEs, clearing along alternate pathway of  Ipsilateral trunk  to  Ipsilateral axillary  lymph nodes    Clear Nodes 10x each   Neck x   [address]    Axilla x   Abdomen x   Groin x   Popliteal x2 x   Clear Alternate Pathway 10x each   Re-clear alternate pathway   x5 each position x   Location Ipsilateral trunk  X2       Fluid Mobilization 10x each   Re-clear alternate pathway   x5 each position x   Shoulder bracing    Location B LEs        Protein Resorption 10x each   Location Medial R thigh        Clear Foot/Ankle or Hand 10x each   Achilles x   Bilateral malleolus x   Fan the cards x   Clear dorsum x   Clear through web space x   Clear toes/fingers x   Fluid mobilization x   Re-clear all positions  X5 each x           Modalities:   4/14 advanced pneumatic Lymphedema pump B LE and groin/abdomen x 30 min at normal pressure  4/11 lymph pump B LE during MLD of trunk and opp LE - 20 min ea LE.   4/6: advanced pneumatic Lymphedema pump R LE and groin/abdomen x 30 min at normal pressure; no complaints of pain or discomfort; patient reporting that it feels more comfortable on his leg than his (36494) Therapist is in constant attendance of 2 or more patients providing skilled therapy interventions, but not providing any significant amount of measurable one-on-one time to either patient, for improvements in  [] LE / lumbar: LE, proximal hip, and core control in self care, mobility, lifting, ambulation and eccentric single leg control. [] UE / cervical: cervical, scapular, scapulothoracic and UE control with self care, reaching, carrying, lifting, house/yardwork, driving, computer work. NMR and Therapeutic Activities:    [x] (51842 or 35656) Provided verbal/tactile cueing for activities related to improving balance, coordination, kinesthetic sense, posture, motor skill, proprioception and motor activation to allow for proper function of   [x] LE: / Lumbar core, proximal hip and LE with self care and ADLs  [] UE / Cervical: cervical, postural, scapular, scapulothoracic and UE control with self care, carrying, lifting, driving, computer work.   [] (26753) Gait Re-education- Provided training and instruction to the patient for proper LE, core and proximal hip recruitment and positioning and eccentric body weight control with ambulation re-education including up and down stairs     Home Management Training / Self Care:  [] (16125) Provided self-care/home management training related to activities of daily living and compensatory training, and/or use of adaptive equipment for improvement with: ADLs and compensatory training, meal preparation, safety procedures and instruction in use of adaptive equipment, including bathing, grooming, dressing, personal hygiene, basic household cleaning and chores.      Home Exercise Program:    [x] (81356) Reviewed/Progressed HEP activities related to strengthening, flexibility, endurance, ROM of   [] LE / Lumbar: core, proximal hip and LE for functional self-care, mobility, lifting and ambulation/stair navigation   [] UE / Cervical: cervical, postural, scapular, scapulothoracic and UE control with self care, reaching, carrying, lifting, house/yardwork, driving, computer work  [] (16361)Reviewed/Progressed HEP activities related to improving balance, coordination, kinesthetic sense, posture, motor skill, proprioception of   [] LE: core, proximal hip and LE for self care, mobility, lifting, and ambulation/stair navigation    [] UE / Cervical: cervical, postural,  scapular, scapulothoracic and UE control with self care, reaching, carrying, lifting, house/yardwork, driving, computer work    Manual Treatments:  PROM / STM / Oscillations-Mobs:  G-I, II, III, IV (PA's, Inf., Post.)  [x] (16184) Provided manual therapy to mobilize LE, proximal hip and/or LS spine soft tissue/joints for the purpose of modulating pain, promoting relaxation,  increasing ROM, reducing/eliminating soft tissue swelling/inflammation/restriction, improving soft tissue extensibility and allowing for proper ROM for normal function with   [x] LE / lumbar: self care, mobility, lifting and ambulation. [] UE / Cervical: self care, reaching, carrying, lifting, house/yardwork, driving, computer work. Modalities:  [x] (62078) Vasopneumatic compression: Utilized vasopneumatic compression to decrease edema / swelling for the purpose of improving mobility and quad tone / recruitment which will allow for increased overall function including but not limited to self-care, transfers, ambulation, and ascending / descending stairs.        Charges:  Timed Code Treatment Minutes: 45   Total Treatment Minutes: 75     [] EVAL - LOW (76561)   [] EVAL - MOD (74671)  [] EVAL - HIGH (17474)  [] RE-EVAL (26044)  [] ZE(95226) x       [] Ionto  [] NMR (52206) x       [x] Vaso  [x] Manual (38983) x 3      [] Ultrasound  [] TA x  1      [] Mech Traction (44992)  [] Aquatic Therapy x     [] ES (un) (91786):   [] Home Management Training x  [] ES(attended) (30322)   [] Dry Needling 1-2 muscles (43862):  [] Dry Needling 3+ muscles (976902  [] Group:      [] Other:     GOALS:   Patient stated goal: \"get water off me\"  []? Progressing: []? Met: []? Not Met: []? Adjusted     Therapist goals for Patient:   Short Term Goals: To be achieved in: 2 weeks  1. Independent in HEP and progression per patient tolerance, in order to prevent return of swelling   []? Progressing: []? Met: []? Not Met: []? Adjusted  2. Patient will have a decrease in swelling/pain to facilitate improvement in movement, function, and ADLs as indicated by improvement with FOTO outcome measure. []? Progressing: []? Met: []? Not Met: []? Adjusted     Long Term Goals: To be achieved in: 6 weeks  1. Disability index score of 80 or higher on FOTO outcome assessment to assist with reaching prior level of function. []? Progressing: []? Met: []? Not Met: []? Adjusted  2. Pt will report ability to walk >1 mile without limitations to assist with job activities with [employer]. []? Progressing: []? Met: []? Not Met: []? Adjusted  3. Decrease swelling of RLE by 30 cm so that pt can return to functional activities including ambulation without increased symptoms or restriction. []? Progressing: []? Met: []? Not Met: []? Adjusted  4. Decrease swelling of LLE by 20 cm so that pt can return to functional activities including ambulation without increased symptoms or restriction. []? Progressing: []? Met: []? Not Met: []? Adjusted    Overall Progression Towards Functional goals/ Treatment Progress Update:  [x] Patient is progressing as expected towards functional goals listed. [] Progression is slowed due to complexities/Impairments listed. [] Progression has been slowed due to co-morbidities.   [] Plan just implemented, too soon to assess goals progression <30days   [] Goals require adjustment due to lack of progress  [] Patient is not progressing as expected and requires additional follow up with physician  [] Other    Persisting Functional Limitations/Impairments:  []Sleeping []Sitting               [x]Standing []Transfers        [x]Walking []Kneeling               []Stairs []Squatting / bending   [x]ADLs []Reaching  []Lifting  []Housework  []Driving []Job related tasks  []Sports/Recreation []Other:        ASSESSMENT:   4/14 Pt with decreased edema in BLEs and increased hydration in skin this date. Decreased fibrotic tissue in lower legs, but continues to have fibrotic tissue in medial R thigh. Completed lymphedema pump at end of treatment. 4/11 pt really likes the lymph pump and will benefit from use of advancedpump at home. Has appt to get circ aid for thigh tomorrow - will benefit from compression of R thigh lobule and will benefit from use of chip pads/foam cut outs to put over fibrotic areas whe wearing circaids. 4/6 responding well to advanced pneumatic pump; has not yet obtained new garments as advised last visit, but is wearing his compression daily and is elevating legs as often as possible throughout the day    Treatment/Activity Tolerance:  [x] Patient able to complete tx [] Patient limited by fatigue  [] Patient limited by pain  [] Patient limited by other medical complications  [] Other:     Prognosis: [x] Good [] Fair  [] Poor    Patient Requires Follow-up: [x] Yes  [] No    Plan for next treatment session:   MLD, compression, HEP, pt education, lymph pump as appropriate, exercise to stimulate lymphatic flow    PLAN: See eval. PT 2x / week for 6 weeks. [x] Continue per plan of care [] Alter current plan (see comments)  [] Plan of care initiated [] Hold pending MD visit [] Discharge    Electronically signed by: René Perry, PT, DPT    Note: If patient does not return for scheduled/ recommended follow up visits, this note will serve as a discharge from care along with most recent update on progress.

## 2022-04-18 ENCOUNTER — HOSPITAL ENCOUNTER (OUTPATIENT)
Dept: PHYSICAL THERAPY | Age: 67
Setting detail: THERAPIES SERIES
Discharge: HOME | End: 2022-04-18
Payer: COMMERCIAL

## 2022-04-18 NOTE — FLOWSHEET NOTE
Physical Therapy  Cancellation/No-show Note  Patient Name:  Karen Vegas  :  1955   Date:  2022  Cancelled visits to date: 0  No-shows to date: 0    Patient status for today's appointment patient:  [x]  Cancelled   []  Rescheduled appointment  []  No-show     Reason given by patient:  [x]  Patient ill 22  []  Conflicting appointment  []  No transportation    []  Conflict with work  []  No reason given  []  Other:     Comments:      Phone call information:   []  Phone call made today to patient at _ time at number provided:      []  Patient answered, conversation as follows:    []  Patient did not answer, message left as follows:  []  Phone call not made today  [x]  Phone call not needed - pt contacted us to cancel and provided reason for cancellation.      Electronically signed by:  Mandi Garcia PT

## 2022-04-20 ENCOUNTER — HOSPITAL ENCOUNTER (OUTPATIENT)
Dept: PHYSICAL THERAPY | Age: 67
Setting detail: THERAPIES SERIES
Discharge: HOME | End: 2022-04-20
Payer: COMMERCIAL

## 2022-04-20 PROCEDURE — 97016 VASOPNEUMATIC DEVICE THERAPY: CPT

## 2022-04-20 PROCEDURE — 97140 MANUAL THERAPY 1/> REGIONS: CPT

## 2022-04-20 NOTE — FLOWSHEET NOTE
[facility]  Phone: [phone]   Fax: [phone]    Physical Therapy Daily LYMPHEDEMA Treatment Note    Date:  2022    Patient Name:  Kunal Najera    :  1955  MRN: [id number]  Medical/Treatment Diagnosis Information:  · Diagnosis: I89.0 Lymphedema  · Treatment Diagnosis: BLE lymphedema, R>L  Insurance/Certification information:  PT Insurance Information: [facility] [employer] [address], no CP, NO unattended estim  Physician Information:  Referring Practitioner: Rachel Miramontes MD  Plan of care signed (Y/N): []  Yes [x]  No --faxed on ; initial cert re-faxed: [phone],     Date of Patient follow up with Physician:     Functional scale:   [address] - physical primary measure: 64/100, risk adjusted: 47    3/29/22    Progress Report: []  Yes  [x]  No    Date Range for reporting period:  Beginning  3/29  Ending     Progress report due (10 Rx/or 30 days whichever is less): visit #10 or  (date)     Recertification due (POC duration/ or 90 days whichever is less): visit #12 or  (date)     Visit # Insurance Allowable Auth required? Date Range    Med nec []  Yes  [x]  No         Latex Allergy:  [x]NO      []YES  Preferred Language for Healthcare:   [x]English       []other:      Pain level: 9/10 R LE, L LE 12/10     SUBJECTIVE:  pt states he stays in pain and just lives with it, declined to give a number. Hasn't gotten his circaids yet     States he got fitted for circaids and they will call him when they get it figured out. Reports he is doing well today. States he has pain in his ankles.  attributes increased pain to the weather changes. State weather changes always make his swelling worse. States he really liked the pump - not sure if he can afford it. Uses hemp lotion on his legs for pain control.  Has appt at [facility] tomorrow to get circaids for thighs and a chip pad for R inner thigh    4/6: 10 min late for appointment, wearing below the knee circaid style compression that appear to be well worn. Magaly from [employer] present for session to discuss home pump; patient could benefit from advanced pneumatic pump to address existing swelling in B LE and groin/abdomen. Patient verbalizing concern about his ability to obtain the unit due to financial constraints. OBJECTIVE: 4/11 pt 30 min late to appt - he attributes this to had to go to bathroom and couldn't find the clinic phone number. PT gave pt a business card with #  Fibrosis R medial thigh, distal LE B  Skin: significant flakey skin that falls off LE when pt changes stockinettes. Hemosidran staining distal LE B     4/6: [employer] able to document reduced limb girth in all locations including hips and abdomen following 30 min session on advanced pump today  4/4: very dry skin, especially B feet and ankles. Noted hyperpigmentation bilaterally, worse on the right. Fibrosis noted at the right upper inner thigh lobule and to a lesser degree bilateral lower limbs. Patient uses silvadene on bilateral legs to assist with management of the dry skin.       RESTRICTIONS/PRECAUTIONS: DM (checks blood sugar weekly or when he is symptomatic), HTN     Exercises/Interventions:     Therapeutic Exercises (92398) Resistance / level Sets/sec Reps Notes          Pt educated on exercises to stimulate lymphatic flow     x Reviewed importance of exercise with compression as well as regular elevation of B LE to help minimize swelling                               Therapeutic Activities (67604)  (Dynamic activities such as compression, designed to improve functional performance)       Compression: trial tensoshape size   applied to        Multilayer compression bandaging to   Stockinette  Artiflex  Foam   cm low stretch compression bandage  cm low stretch compression bandage  cm low stretch compression bandage  cm low stretch compression bandage Home Management  (providing pt education on safety procedures/instructions)       Pt educated on compression as follows:   how to appropriately apply and wear compression  how to maintain appropriate gradient compression  do not sleep with compression garment/tensoshape  signs and symptoms of constriction   when to remove compression I      Pt educated on lymphedema prevention and management        Educated on skin care and using gentle non scented lotion   Review     Discussed recommendation for chip pad over the right leg lobule and improved leg compression B with new garments.  Resource list provided  Review                          Neuromuscular Re-ed (30996)                            Manual Intervention (79850)       See MLD flowchart below 45'      [employer] trial of advanced pneumatic pump with abdomen/groin piece as well as LE piece 4/6/22                                    Manual Lymph Drainage (MLD):  MLD to B LEs, clearing along alternate pathway of  Ipsilateral trunk  to  Ipsilateral axillary  lymph nodes    Clear Nodes 10x each   Neck x   [address]    Axilla x   Abdomen x   Groin x   Popliteal x2 x   Clear Alternate Pathway 10x each   Re-clear alternate pathway   x5 each position x   Location Ipsilateral trunk  X2       Fluid Mobilization 10x each   Re-clear alternate pathway   x5 each position x   Shoulder bracing    Location B LEs        Protein Resorption 10x each   Location Medial R thigh        Clear Foot/Ankle or Hand 10x each   Achilles x   Bilateral malleolus x   Fan the cards x   Clear dorsum x   Clear through web space x   Clear toes/fingers x   Fluid mobilization x   Re-clear all positions  X5 each x           Modalities:   4/20, 4/14 advanced pneumatic Lymphedema pump B LE and groin/abdomen x 30 min at normal pressure  4/11 lymph pump B LE during MLD of trunk and opp LE - 20 min ea LE.   4/6: advanced pneumatic Lymphedema pump R LE and groin/abdomen x 30 min at normal pressure; no complaints of pain or discomfort; patient reporting that it feels more comfortable on his leg than his current home pump. OTHER: 3/29 pt signed release of medical information form for [facility] medical to obtain advanced home lymphedema pump     Pt education:   4/11 instruct pt in how to do self MLD swipe method, clearing B LE  to ipsilateral axillary lymph nodes. Instruct in use of chip pad/foam under circaids, instruct in skin care  3/29  Pt educated on pathology and anatomy of etiology of lymphedema, condition precautions, indications for long term prognosis. Pt was educated on diagnosis; prognosis; PT POC including MLD, compression (role of multilayer bandaging/ compression garments), lymphedema management/prevention of flare ups, role of exercise, HEP, lymphedema pump; expectations for rehab. All pt questions were answered. HEP instruction:  4/11 instructed pt in swipe method of MLD clearing B LE  to ipsilateral axillary lymph nodes. Instruct in use of chip pad/foam under circaids  3/29 Pt instructed in lymphedema management/prevention concepts. Bring circaids NV. Moisturize daily with gentle nonscented lotion. Therapeutic Exercise and NMR EXR  [] (49890) Provided verbal/[facility]actile cueing for activities related to strengthening, flexibility, endurance, ROM for improvements in  [] LE / Lumbar: LE, proximal hip, and core control with self care, mobility, lifting, ambulation. [] UE / Cervical: cervical, postural, scapular, scapulothoracic and UE control with self care, reaching, carrying, lifting, house/yardwork, driving, computer work.  [] (17576) Provided verbal/tactile cueing for activities related to improving balance, coordination, kinesthetic sense, posture, motor skill, proprioception to assist with   [] LE / lumbar: LE, proximal hip, and core control in self care, mobility, lifting, ambulation and eccentric single leg control.    [] UE / cervical: cervical, scapular, scapulothoracic and UE control with self care, reaching, carrying, lifting, house/yardwork, driving, computer work.   [] (91900) Therapist is in constant attendance of 2 or more patients providing skilled therapy interventions, but not providing any significant amount of measurable one-on-one time to either patient, for improvements in  [] LE / lumbar: LE, proximal hip, and core control in self care, mobility, lifting, ambulation and eccentric single leg control. [] UE / cervical: cervical, scapular, scapulothoracic and UE control with self care, reaching, carrying, lifting, house/yardwork, driving, computer work. NMR and Therapeutic Activities:    [x] (82760 or 09650) Provided verbal/tactile cueing for activities related to improving balance, coordination, kinesthetic sense, posture, motor skill, proprioception and motor activation to allow for proper function of   [x] LE: / Lumbar core, proximal hip and LE with self care and ADLs  [] UE / Cervical: cervical, postural, scapular, scapulothoracic and UE control with self care, carrying, lifting, driving, computer work.   [] (07453) Gait Re-education- Provided training and instruction to the patient for proper LE, core and proximal hip recruitment and positioning and eccentric body weight control with ambulation re-education including up and down stairs     Home Management Training / Self Care:  [] (43818) Provided self-care/home management training related to activities of daily living and compensatory training, and/or use of adaptive equipment for improvement with: ADLs and compensatory training, meal preparation, safety procedures and instruction in use of adaptive equipment, including bathing, grooming, dressing, personal hygiene, basic household cleaning and chores.      Home Exercise Program:    [x] (85298) Reviewed/Progressed HEP activities related to strengthening, flexibility, endurance, ROM of   [] LE / Lumbar: core, proximal hip and LE for functional Management Training x  [] ES(attended) (77593)   [] Dry Needling 1-2 muscles (47420):  [] Dry Needling 3+ muscles (587397  [] Group:      [] Other:     GOALS:   Patient stated goal: \"get water off me\"  []? Progressing: []? Met: []? Not Met: []? Adjusted     Therapist goals for Patient:   Short Term Goals: To be achieved in: 2 weeks  1. Independent in HEP and progression per patient tolerance, in order to prevent return of swelling   []? Progressing: []? Met: []? Not Met: []? Adjusted  2. Patient will have a decrease in swelling/pain to facilitate improvement in movement, function, and ADLs as indicated by improvement with FOTO outcome measure. []? Progressing: []? Met: []? Not Met: []? Adjusted     Long Term Goals: To be achieved in: 6 weeks  1. Disability index score of 80 or higher on FOTO outcome assessment to assist with reaching prior level of function. []? Progressing: []? Met: []? Not Met: []? Adjusted  2. Pt will report ability to walk >1 mile without limitations to assist with job activities with [employer]. []? Progressing: []? Met: []? Not Met: []? Adjusted  3. Decrease swelling of RLE by 30 cm so that pt can return to functional activities including ambulation without increased symptoms or restriction. []? Progressing: []? Met: []? Not Met: []? Adjusted  4. Decrease swelling of LLE by 20 cm so that pt can return to functional activities including ambulation without increased symptoms or restriction. []? Progressing: []? Met: []? Not Met: []? Adjusted    Overall Progression Towards Functional goals/ Treatment Progress Update:  [x] Patient is progressing as expected towards functional goals listed. [] Progression is slowed due to complexities/Impairments listed. [] Progression has been slowed due to co-morbidities.   [] Plan just implemented, too soon to assess goals progression <30days   [] Goals require adjustment due to lack of progress  [] Patient is not progressing as expected and requires additional follow up with physician  [] Other    Persisting Functional Limitations/Impairments:  []Sleeping []Sitting               [x]Standing []Transfers        [x]Walking []Kneeling               []Stairs []Squatting / bending   [x]ADLs []Reaching  []Lifting  []Housework  []Driving []Job related tasks  []Sports/Recreation []Other:        ASSESSMENT:   4/14 Pt with decreased edema in BLEs and increased hydration in skin this date. Decreased fibrotic tissue in lower legs, but continues to have fibrotic tissue in medial R thigh. Completed lymphedema pump at end of treatment. 4/11 pt really likes the lymph pump and will benefit from use of advancedpump at home. Has appt to get circ aid for thigh tomorrow - will benefit from compression of R thigh lobule and will benefit from use of chip pads/foam cut outs to put over fibrotic areas whe wearing circaids. 4/6 responding well to advanced pneumatic pump; has not yet obtained new garments as advised last visit, but is wearing his compression daily and is elevating legs as often as possible throughout the day    Treatment/Activity Tolerance:  [x] Patient able to complete tx [] Patient limited by fatigue  [] Patient limited by pain  [] Patient limited by other medical complications  [] Other:     Prognosis: [x] Good [] Fair  [] Poor    Patient Requires Follow-up: [x] Yes  [] No    Plan for next treatment session:   MLD, compression, HEP, pt education, lymph pump as appropriate, exercise to stimulate lymphatic flow    PLAN: See eval. PT 2x / week for 6 weeks. [x] Continue per plan of care [] Alter current plan (see comments)  [] Plan of care initiated [] Hold pending MD visit [] Discharge    Electronically signed by: Laxmi Montiel, PT, DPT    Note: If patient does not return for scheduled/ recommended follow up visits, this note will serve as a discharge from care along with most recent update on progress.

## 2022-04-25 ENCOUNTER — HOSPITAL ENCOUNTER (OUTPATIENT)
Dept: PHYSICAL THERAPY | Age: 67
Setting detail: THERAPIES SERIES
Discharge: HOME | End: 2022-04-25
Payer: COMMERCIAL

## 2022-04-25 PROCEDURE — 97016 VASOPNEUMATIC DEVICE THERAPY: CPT

## 2022-04-25 PROCEDURE — 97140 MANUAL THERAPY 1/> REGIONS: CPT

## 2022-04-25 NOTE — FLOWSHEET NOTE
[facility] Physical Therapy  Phone: [phone]   Fax: [phone]    Physical Therapy Daily LYMPHEDEMA Treatment Note    Date:  2022    Patient Name:  Robyn Andrew    :  1955  MRN: [id number]  Medical/Treatment Diagnosis Information:  · Diagnosis: I89.0 Lymphedema  · Treatment Diagnosis: BLE lymphedema, R>L  Insurance/Certification information:  PT Insurance Information: [facility] Choice [address], no CP, NO unattended estim  Physician Information:  Referring Practitioner: Wilfred Cruz MD  Plan of care signed (Y/N): []  Yes [x]  No --faxed on 04; initial cert re-faxed: 5901,     Date of Patient follow up with Physician:     Functional scale:   [address] - physical primary measure: 64/100, risk adjusted: 47    3/29/22    Progress Report: []  Yes  [x]  No    Date Range for reporting period:  Beginning  3/29  Ending     Progress report due (10 Rx/or 30 days whichever is less): visit #10 or  (date)     Recertification due (POC duration/ or 90 days whichever is less): visit #12 or  (date)     Visit # Insurance Allowable Auth required? Date Range    Med nec []  Yes  [x]  No         Latex Allergy:  [x]NO      []YES  Preferred Language for Healthcare:   [x]English       []other:      Pain level: 9/10 R LE, L LE 10/10     SUBJECTIVE: :states he spent the weekend at a camp out so had a good time. But really hurting. States they told him the pump would be 2,000.00 so he said no to it.  pt states he stays in pain and just lives with it, declined to give a number. Hasn't gotten his circaids yet   States he got fitted for circaids and they will call him when they get it figured out. Reports he is doing well today. States he has pain in his ankles.  attributes increased pain to the weather changes. State weather changes always make his swelling worse.   States he really liked the pump - not sure if he can afford it. Uses hemp lotion on his legs for pain control. Has appt at [facility] tomorrow to get circaids for thighs and a chip pad for R inner thigh    4/6: 10 min late for appointment, wearing below the knee circaid style compression that appear to be well worn. Magaly from [employer] present for session to discuss home pump; patient could benefit from advanced pneumatic pump to address existing swelling in B LE and groin/abdomen. Patient verbalizing concern about his ability to obtain the unit due to financial constraints. OBJECTIVE: 4/25: pt to the clinic with no compression garments   4/11 pt 30 min late to appt - he attributes this to had to go to bathroom and couldn't find the clinic phone number. PT gave pt a business card with #  Fibrosis R medial thigh, distal LE B  Skin: significant flakey skin that falls off LE when pt changes stockinettes. Hemosidran staining distal LE B     4/6: [facility] able to document reduced limb girth in all locations including hips and abdomen following 30 min session on advanced pump today  4/4: very dry skin, especially B feet and ankles. Noted hyperpigmentation bilaterally, worse on the right. Fibrosis noted at the right upper inner thigh lobule and to a lesser degree bilateral lower limbs. Patient uses silvadene on bilateral legs to assist with management of the dry skin.       RESTRICTIONS/PRECAUTIONS: DM (checks blood sugar weekly or when he is symptomatic), HTN     Exercises/Interventions:     Therapeutic Exercises (19735) Resistance / level Sets/sec Reps Notes          Pt educated on exercises to stimulate lymphatic flow     x Reviewed importance of exercise with compression as well as regular elevation of B LE to help minimize swelling                               Therapeutic Activities (93531)  (Dynamic activities such as compression, designed to improve functional performance)       Compression: trial tensoshape size   applied to        Multilayer compression bandaging to   Stockinette  Artiflex  Foam   cm low stretch compression bandage  cm low stretch compression bandage  cm low stretch compression bandage  cm low stretch compression bandage                                              Home Management  (providing pt education on safety procedures/instructions)       Pt educated on compression as follows:   how to appropriately apply and wear compression  how to maintain appropriate gradient compression  do not sleep with compression garment/tensoshape  signs and symptoms of constriction   when to remove compression I      Pt educated on lymphedema prevention and management        Educated on skin care and using gentle non scented lotion   Review     Discussed recommendation for chip pad over the right leg lobule and improved leg compression B with new garments.  Resource list provided  Review                          Neuromuscular Re-ed (59522)                            Manual Intervention (23911)       See MLD flowchart below 48'      [facility] trial of advanced pneumatic pump with abdomen/groin piece as well as LE piece 4/6/22                                    Manual Lymph Drainage (MLD):  MLD to B LEs, clearing along alternate pathway of  Ipsilateral trunk  to  Ipsilateral axillary  lymph nodes    Clear Nodes 10x each   Neck x   [address]    Axilla x   Abdomen x   Groin x   Popliteal x2 x   Clear Alternate Pathway 10x each   Re-clear alternate pathway   x5 each position x   Location Ipsilateral trunk  X2       Fluid Mobilization 10x each   Re-clear alternate pathway   x5 each position x   Shoulder bracing    Location B LEs        Protein Resorption 10x each   Location Medial R thigh        Clear Foot/Ankle or Hand 10x each   Achilles x   Bilateral malleolus x   Fan the cards x   Clear dorsum x   Clear through web space x   Clear toes/fingers x   Fluid mobilization x   Re-clear all positions  X5 each x           Modalities:   4/25, 4/20, 4/14 advanced pneumatic Lymphedema pump B LE and groin/abdomen x 30 min at normal pressure  4/11 lymph pump B LE during MLD of trunk and opp LE - 20 min ea LE.   4/6: advanced pneumatic Lymphedema pump R LE and groin/abdomen x 30 min at normal pressure; no complaints of pain or discomfort; patient reporting that it feels more comfortable on his leg than his current home pump. OTHER: 3/29 pt signed release of medical information form for [facility] medical to obtain advanced home lymphedema pump     Pt education:   4/11 instruct pt in how to do self MLD swipe method, clearing B LE  to ipsilateral axillary lymph nodes. Instruct in use of chip pad/foam under circaids, instruct in skin care  3/29  Pt educated on pathology and anatomy of etiology of lymphedema, condition precautions, indications for long term prognosis. Pt was educated on diagnosis; prognosis; PT POC including MLD, compression (role of multilayer bandaging/ compression garments), lymphedema management/prevention of flare ups, role of exercise, HEP, lymphedema pump; expectations for rehab. All pt questions were answered. HEP instruction:  4/11 instructed pt in swipe method of MLD clearing B LE  to ipsilateral axillary lymph nodes. Instruct in use of chip pad/foam under circaids  3/29 Pt instructed in lymphedema management/prevention concepts. Bring circaids NV. Moisturize daily with gentle nonscented lotion. Therapeutic Exercise and NMR EXR  [] (26953) Provided verbal/tactile cueing for activities related to strengthening, flexibility, endurance, ROM for improvements in  [] LE / Lumbar: LE, proximal hip, and core control with self care, mobility, lifting, ambulation.   [] UE / Cervical: cervical, postural, scapular, scapulothoracic and UE control with self care, reaching, carrying, lifting, house/yardwork, driving, computer work.  [] (10012) Provided verbal/tactile cueing for activities related to improving balance, coordination, kinesthetic sense, posture, motor cleaning and chores. Home Exercise Program:    [x] (78907) Reviewed/Progressed HEP activities related to strengthening, flexibility, endurance, ROM of   [] LE / Lumbar: core, proximal hip and LE for functional self-care, mobility, lifting and ambulation/stair navigation   [] UE / Cervical: cervical, postural, scapular, scapulothoracic and UE control with self care, reaching, carrying, lifting, house/yardwork, driving, computer work  [] (07718)Reviewed/Progressed HEP activities related to improving balance, coordination, kinesthetic sense, posture, motor skill, proprioception of   [] LE: core, proximal hip and LE for self care, mobility, lifting, and ambulation/stair navigation    [] UE / Cervical: cervical, postural,  scapular, scapulothoracic and UE control with self care, reaching, carrying, lifting, house/yardwork, driving, computer work    Manual Treatments:  PROM / STM / Oscillations-Mobs:  G-I, II, III, IV (PA's, Inf., Post.)  [x] (84908) Provided manual therapy to mobilize LE, proximal hip and/or LS spine soft tissue/joints for the purpose of modulating pain, promoting relaxation,  increasing ROM, reducing/eliminating soft tissue swelling/inflammation/restriction, improving soft tissue extensibility and allowing for proper ROM for normal function with   [x] LE / lumbar: self care, mobility, lifting and ambulation. [] UE / Cervical: self care, reaching, carrying, lifting, house/yardwork, driving, computer work. Modalities:  [x] (87472) Vasopneumatic compression: Utilized vasopneumatic compression to decrease edema / swelling for the purpose of improving mobility and quad tone / recruitment which will allow for increased overall function including but not limited to self-care, transfers, ambulation, and ascending / descending stairs.        Charges:  Timed Code Treatment Minutes: 48   Total Treatment Minutes: 78     [] EVAL - LOW (43743)   [] EVAL - MOD (44867)  [] EVAL - HIGH (44823)  [] RE-EVAL (55160)  [] DZ(68330) x       [] Ionto  [] NMR (37774) x       [x] Vaso  [x] Manual (24525) x 3      [] Ultrasound  [] TA x  1      [] Mech Traction (07717)  [] Aquatic Therapy x     [] ES (un) (51875):   [] Home Management Training x  [] ES(attended) (26532)   [] Dry Needling 1-2 muscles (98705):  [] Dry Needling 3+ muscles (918360  [] Group:      [] Other:     GOALS:   Patient stated goal: \"get water off me\"  []? Progressing: []? Met: []? Not Met: []? Adjusted     Therapist goals for Patient:   Short Term Goals: To be achieved in: 2 weeks  1. Independent in HEP and progression per patient tolerance, in order to prevent return of swelling   []? Progressing: []? Met: []? Not Met: []? Adjusted  2. Patient will have a decrease in swelling/pain to facilitate improvement in movement, function, and ADLs as indicated by improvement with FOTO outcome measure. []? Progressing: []? Met: []? Not Met: []? Adjusted     Long Term Goals: To be achieved in: 6 weeks  1. Disability index score of 80 or higher on FOTO outcome assessment to assist with reaching prior level of function. []? Progressing: []? Met: []? Not Met: []? Adjusted  2. Pt will report ability to walk >1 mile without limitations to assist with job activities with [employer]. []? Progressing: []? Met: []? Not Met: []? Adjusted  3. Decrease swelling of RLE by 30 cm so that pt can return to functional activities including ambulation without increased symptoms or restriction. []? Progressing: []? Met: []? Not Met: []? Adjusted  4. Decrease swelling of LLE by 20 cm so that pt can return to functional activities including ambulation without increased symptoms or restriction. []? Progressing: []? Met: []? Not Met: []? Adjusted    Overall Progression Towards Functional goals/ Treatment Progress Update:  [x] Patient is progressing as expected towards functional goals listed. [] Progression is slowed due to complexities/Impairments listed.   [] Progression has been slowed due to co-morbidities. [] Plan just implemented, too soon to assess goals progression <30days   [] Goals require adjustment due to lack of progress  [] Patient is not progressing as expected and requires additional follow up with physician  [] Other    Persisting Functional Limitations/Impairments:  []Sleeping []Sitting               [x]Standing []Transfers        [x]Walking []Kneeling               []Stairs []Squatting / bending   [x]ADLs []Reaching  []Lifting  []Housework  []Driving []Job related tasks  []Sports/Recreation []Other:        ASSESSMENT: 4/25: Pt not wearing compression garments. L >R in edema this day. Continued fibrotic tissue in LE   4/14 Pt with decreased edema in BLEs and increased hydration in skin this date. Decreased fibrotic tissue in lower legs, but continues to have fibrotic tissue in medial R thigh. Completed lymphedema pump at end of treatment. 4/11 pt really likes the lymph pump and will benefit from use of advancedpump at home. Has appt to get circ aid for thigh tomorrow - will benefit from compression of R thigh lobule and will benefit from use of chip pads/foam cut outs to put over fibrotic areas whe wearing circaids. 4/6 responding well to advanced pneumatic pump; has not yet obtained new garments as advised last visit, but is wearing his compression daily and is elevating legs as often as possible throughout the day    Treatment/Activity Tolerance:  [x] Patient able to complete tx [] Patient limited by fatigue  [] Patient limited by pain  [] Patient limited by other medical complications  [] Other:     Prognosis: [x] Good [] Fair  [] Poor    Patient Requires Follow-up: [x] Yes  [] No    Plan for next treatment session:   MLD, compression, HEP, pt education, lymph pump as appropriate, exercise to stimulate lymphatic flow    PLAN: See eval. PT 2x / week for 6 weeks.    [x] Continue per plan of care [] Alter current plan (see comments)  [] Plan of care initiated [] Hold pending MD visit [] Discharge    Electronically signed by: Cesario Murdock PT, DPT    Note: If patient does not return for scheduled/ recommended follow up visits, this note will serve as a discharge from care along with most recent update on progress.

## 2022-04-27 ENCOUNTER — HOSPITAL ENCOUNTER (OUTPATIENT)
Dept: PHYSICAL THERAPY | Age: 67
Setting detail: THERAPIES SERIES
Discharge: HOME | End: 2022-04-27
Payer: COMMERCIAL

## 2022-04-27 NOTE — FLOWSHEET NOTE
Physical Therapy  Cancellation/No-show Note  Patient Name:  Yenni Santizo  :  1955   Date:  2022  Cancelled visits to date: 2  No-shows to date: 0    Patient status for today's appointment patient:  [x]  Cancelled ,   []  Rescheduled appointment  []  No-show     Reason given by patient:  []  Patient ill 3/51/83  [x]  Conflicting appointment   []  No transportation    []  Conflict with work  []  No reason given  []  Other:     Comments:      Phone call information:   []  Phone call made today to patient at _ time at number provided:      []  Patient answered, conversation as follows:    []  Patient did not answer, message left as follows:  []  Phone call not made today  [x]  Phone call not needed - pt contacted us to cancel and provided reason for cancellation.      Electronically signed by:  Chelly Cisneros, PT, DPT

## 2022-05-02 ENCOUNTER — HOSPITAL ENCOUNTER (OUTPATIENT)
Dept: PHYSICAL THERAPY | Age: 67
Setting detail: THERAPIES SERIES
Discharge: HOME | End: 2022-05-02

## 2022-05-02 NOTE — FLOWSHEET NOTE
Physical Therapy  Cancellation/No-show Note  Patient Name:  Kayla Rivas  :  1955   Date:  2022  Cancelled visits to date: 2  No-shows to date: 0    Patient status for today's appointment patient:  [x]  Cancelled , 22  []  Rescheduled appointment  []  No-show     Reason given by patient:  []  Patient ill 5/10/75  []  Conflicting appointment 1/10  []  No transportation    []  Conflict with work  []  No reason given  [x]  Other:     Comments:  22 woke up late    Phone call information:   []  Phone call made today to patient at _ time at number provided:      []  Patient answered, conversation as follows:    []  Patient did not answer, message left as follows:  []  Phone call not made today  [x]  Phone call not needed - pt contacted us to cancel and provided reason for cancellation.      Electronically signed by:  Clark Maxwell, PT, DPT

## 2022-05-04 ENCOUNTER — HOSPITAL ENCOUNTER (OUTPATIENT)
Dept: PHYSICAL THERAPY | Age: 67
Setting detail: THERAPIES SERIES
Discharge: HOME | End: 2022-05-04

## 2022-05-04 NOTE — FLOWSHEET NOTE
Physical Therapy  Cancellation/No-show Note  Patient Name:  Adele Aparicio  :  1955   Date:  2022  Cancelled visits to date: 3  No-shows to date: 0    Patient status for today's appointment patient:  [x]  Cancelled , 22,   []  Rescheduled appointment  []  No-show     Reason given by patient:  [x]  Patient ill ,   []  Conflicting appointment 3/22  []  No transportation    []  Conflict with work  []  No reason given  []  Other:     Comments:  22 woke up late    Phone call information:   []  Phone call made today to patient at _ time at number provided:      []  Patient answered, conversation as follows:    []  Patient did not answer, message left as follows:  []  Phone call not made today  [x]  Phone call not needed - pt contacted us to cancel and provided reason for cancellation.      Electronically signed by:  Kim Mendez, PT, DPT

## 2022-05-09 ENCOUNTER — HOSPITAL ENCOUNTER (OUTPATIENT)
Dept: PHYSICAL THERAPY | Age: 67
Setting detail: THERAPIES SERIES
Discharge: HOME | End: 2022-05-09

## 2024-07-05 ENCOUNTER — HOSPITAL ENCOUNTER (OUTPATIENT)
Dept: WOUND CARE | Age: 69
Discharge: HOME | End: 2024-07-05
Attending: SPECIALIST
Payer: MEDICARE

## 2024-07-05 VITALS
SYSTOLIC BLOOD PRESSURE: 165 MMHG | HEART RATE: 89 BPM | HEIGHT: 67 IN | TEMPERATURE: 98.4 F | DIASTOLIC BLOOD PRESSURE: 99 MMHG | BODY MASS INDEX: 49.44 KG/M2 | RESPIRATION RATE: 18 BRPM | WEIGHT: 315 LBS

## 2024-07-05 PROCEDURE — 99204 OFFICE O/P NEW MOD 45 MIN: CPT

## 2024-07-05 PROCEDURE — 11042 DBRDMT SUBQ TIS 1ST 20SQCM/<: CPT

## 2024-07-05 RX ORDER — FLUTICASONE PROPIONATE 220 UG/1
1 AEROSOL, METERED RESPIRATORY (INHALATION) 2 TIMES DAILY
COMMUNITY
Start: 2023-01-10

## 2024-07-05 RX ORDER — DOXAZOSIN 2 MG/1
2 TABLET ORAL NIGHTLY
COMMUNITY

## 2024-07-05 RX ORDER — CYCLOBENZAPRINE HCL 10 MG
10 TABLET ORAL 3 TIMES DAILY PRN
COMMUNITY
Start: 2022-06-07

## 2024-07-05 RX ORDER — HYDROCODONE BITARTRATE AND ACETAMINOPHEN 10; 325 MG/1; MG/1
1 TABLET ORAL EVERY 6 HOURS PRN
COMMUNITY
Start: 2023-10-04

## 2024-07-05 RX ORDER — ALBUTEROL SULFATE 90 UG/1
1 AEROSOL, METERED RESPIRATORY (INHALATION) EVERY 4 HOURS PRN
COMMUNITY

## 2024-07-05 RX ORDER — NALOXONE HYDROCHLORIDE 4 MG/.1ML
1 SPRAY NASAL PRN
COMMUNITY
Start: 2021-06-01

## 2024-07-05 RX ORDER — IBUPROFEN 600 MG/1
600 TABLET ORAL EVERY 6 HOURS PRN
COMMUNITY
Start: 2024-06-05

## 2024-07-05 RX ORDER — LEVOTHYROXINE SODIUM 0.07 MG/1
75 TABLET ORAL DAILY
COMMUNITY
Start: 2023-01-10

## 2024-07-05 RX ORDER — FAMOTIDINE 20 MG/1
20 TABLET, FILM COATED ORAL 2 TIMES DAILY
COMMUNITY
Start: 2024-06-05

## 2024-07-05 RX ORDER — FUROSEMIDE 40 MG/1
1 TABLET ORAL
COMMUNITY
Start: 2023-02-27

## 2024-07-05 RX ORDER — PRAVASTATIN SODIUM 40 MG
40 TABLET ORAL DAILY
COMMUNITY
Start: 2024-04-08

## 2024-07-05 RX ORDER — FLUTICASONE PROPIONATE 50 MCG
1 SPRAY, SUSPENSION (ML) NASAL DAILY
COMMUNITY
Start: 2023-01-10

## 2024-07-05 RX ORDER — SALMETEROL XINAFOATE 50 MCG
1 BLISTER, WITH INHALATION DEVICE INHALATION 2 TIMES DAILY
COMMUNITY
Start: 2022-06-28

## 2024-07-05 RX ORDER — DULAGLUTIDE 1.5 MG/.5ML
1.5 INJECTION, SOLUTION SUBCUTANEOUS WEEKLY
COMMUNITY
Start: 2023-01-26

## 2024-07-05 RX ORDER — LISINOPRIL AND HYDROCHLOROTHIAZIDE 25; 20 MG/1; MG/1
2 TABLET ORAL DAILY
COMMUNITY
Start: 2024-06-18

## 2024-07-05 RX ORDER — SILDENAFIL 100 MG/1
100 TABLET, FILM COATED ORAL DAILY PRN
COMMUNITY
Start: 2022-06-07

## 2024-07-05 RX ORDER — POTASSIUM CHLORIDE 20 MEQ/1
20 TABLET, EXTENDED RELEASE ORAL DAILY
COMMUNITY
Start: 2021-09-14

## 2024-07-05 ASSESSMENT — PAIN DESCRIPTION - DESCRIPTORS: DESCRIPTORS: CRAMPING;DISCOMFORT

## 2024-07-05 ASSESSMENT — PAIN SCALES - GENERAL: PAINLEVEL_OUTOF10: 10

## 2024-07-05 ASSESSMENT — PAIN DESCRIPTION - LOCATION: LOCATION: LEG

## 2024-07-05 ASSESSMENT — PAIN DESCRIPTION - PAIN TYPE: TYPE: ACUTE PAIN

## 2024-07-05 ASSESSMENT — PAIN DESCRIPTION - ORIENTATION: ORIENTATION: LOWER

## 2024-07-05 ASSESSMENT — PAIN - FUNCTIONAL ASSESSMENT: PAIN_FUNCTIONAL_ASSESSMENT: ACTIVITIES ARE NOT PREVENTED

## 2024-07-05 ASSESSMENT — PAIN DESCRIPTION - FREQUENCY: FREQUENCY: INTERMITTENT

## 2024-07-05 NOTE — PATIENT INSTRUCTIONS
[facility]  Patient Instructions and Physician Orders  [address]  Telephone: [phone] FAX [phone]    NAME:  Ke Padron  YOB: 1955  DATE: 7/5/2024       Return Appointment:  Return Appointment: With Alverto Mena MD  in  1 Week(s)  [] Return Appointment for a Wound Assessment with the nurse on:     No future appointments.      No orders of the defined types were placed in this encounter.      Home Care Company: NONE    Medically necessary services for evaluation and treatment:   []Skilled Nursing (using clean technique) []PT (Eval & Treat) []OT (Eval & Treat) []Social Work []Dietician []Other:      Wound care instructions:  If you smoke we ask that you refrain from smoking. Smoking inhibits wounds from healing.  When taking antibiotics take the entire prescription as ordered. Do not stop taking until medication is all gone unless otherwise instructed.   Exercise as tolerated.   Keep weight off wounds and reposition every 2 hours if applicable.  Do not get wounds wet in the bath or shower unless otherwise instructed by your physician. If your wound is on your foot or leg, you may purchase a cast bag/cast cover. This may be purchased at any pharmacy or online.  Wash hands with soap and water prior to and after every dressing change.    [x]Wash wounds with: Mild soap and water  [x]Alicia wound Topical Treatments: Do not apply lotions, creams, or ointments to the skin around the wound bed unless directed as followed:   Apply around the wound: Nothing         [x]Wound Location: RIGHT LOWER LEG    Apply Primary Dressing to wound: Hydrofiber with silver (ie. Opticell Ag, Aquacel Ag)  Secondary Dressing: Extra absorbant pad OR ABD PADS, KERLIX   Avoid contact of tape with skin if possible.  When to change Dressing: DO NOT CHANGE    [x] Edema Control:  velcro compression 30-40mmHG  Apply: Compression Stocking on the Left leg  Apply every morning

## 2024-07-08 PROBLEM — I89.0 LYMPHEDEMA: Status: ACTIVE | Noted: 2024-07-08

## 2024-07-08 PROBLEM — E66.01 MORBID OBESITY (HCC): Status: ACTIVE | Noted: 2024-07-08

## 2024-07-08 PROBLEM — I87.331 IDIOPATHIC CHRONIC VENOUS HYPERTENSION OF RIGHT LOWER EXTREMITY WITH ULCER AND INFLAMMATION (HCC): Status: ACTIVE | Noted: 2024-07-08

## 2024-07-12 ENCOUNTER — HOSPITAL ENCOUNTER (OUTPATIENT)
Dept: WOUND CARE | Age: 69
Discharge: HOME | End: 2024-07-12
Attending: SPECIALIST
Payer: MEDICARE

## 2024-07-12 VITALS
HEART RATE: 93 BPM | SYSTOLIC BLOOD PRESSURE: 122 MMHG | RESPIRATION RATE: 18 BRPM | DIASTOLIC BLOOD PRESSURE: 87 MMHG | TEMPERATURE: 96.8 F

## 2024-07-12 DIAGNOSIS — L97.911 ULCER OF RIGHT LOWER EXTREMITY, LIMITED TO BREAKDOWN OF SKIN (HCC): Primary | ICD-10-CM

## 2024-07-12 DIAGNOSIS — I89.0 LYMPHEDEMA: ICD-10-CM

## 2024-07-12 PROCEDURE — 6370000000 HC RX 637 (ALT 250 FOR IP): Performed by: SPECIALIST

## 2024-07-12 PROCEDURE — 11042 DBRDMT SUBQ TIS 1ST 20SQCM/<: CPT

## 2024-07-12 RX ORDER — LIDOCAINE HYDROCHLORIDE 20 MG/ML
JELLY TOPICAL ONCE
OUTPATIENT
Start: 2024-07-12 | End: 2024-07-12

## 2024-07-12 RX ORDER — GENTAMICIN SULFATE 1 MG/G
OINTMENT TOPICAL ONCE
OUTPATIENT
Start: 2024-07-12 | End: 2024-07-12

## 2024-07-12 RX ORDER — LIDOCAINE HYDROCHLORIDE 40 MG/ML
SOLUTION TOPICAL ONCE
Status: COMPLETED | OUTPATIENT
Start: 2024-07-12 | End: 2024-07-12

## 2024-07-12 RX ORDER — LIDOCAINE 50 MG/G
OINTMENT TOPICAL ONCE
OUTPATIENT
Start: 2024-07-12 | End: 2024-07-12

## 2024-07-12 RX ORDER — IBUPROFEN 200 MG
TABLET ORAL ONCE
OUTPATIENT
Start: 2024-07-12 | End: 2024-07-12

## 2024-07-12 RX ORDER — GINSENG 100 MG
CAPSULE ORAL ONCE
OUTPATIENT
Start: 2024-07-12 | End: 2024-07-12

## 2024-07-12 RX ORDER — LIDOCAINE HYDROCHLORIDE 40 MG/ML
SOLUTION TOPICAL ONCE
OUTPATIENT
Start: 2024-07-12 | End: 2024-07-12

## 2024-07-12 RX ORDER — BETAMETHASONE DIPROPIONATE 0.5 MG/G
CREAM TOPICAL ONCE
OUTPATIENT
Start: 2024-07-12 | End: 2024-07-12

## 2024-07-12 RX ORDER — CLOBETASOL PROPIONATE 0.5 MG/G
OINTMENT TOPICAL ONCE
OUTPATIENT
Start: 2024-07-12 | End: 2024-07-12

## 2024-07-12 RX ORDER — LIDOCAINE 40 MG/G
CREAM TOPICAL ONCE
OUTPATIENT
Start: 2024-07-12 | End: 2024-07-12

## 2024-07-12 RX ORDER — SODIUM CHLOR/HYPOCHLOROUS ACID 0.033 %
SOLUTION, IRRIGATION IRRIGATION ONCE
OUTPATIENT
Start: 2024-07-12 | End: 2024-07-12

## 2024-07-12 RX ORDER — BACITRACIN ZINC AND POLYMYXIN B SULFATE 500; 1000 [USP'U]/G; [USP'U]/G
OINTMENT TOPICAL ONCE
OUTPATIENT
Start: 2024-07-12 | End: 2024-07-12

## 2024-07-12 RX ORDER — TRIAMCINOLONE ACETONIDE 1 MG/G
OINTMENT TOPICAL ONCE
OUTPATIENT
Start: 2024-07-12 | End: 2024-07-12

## 2024-07-12 RX ADMIN — LIDOCAINE HYDROCHLORIDE: 40 SOLUTION TOPICAL at 09:10

## 2024-07-12 ASSESSMENT — PAIN DESCRIPTION - LOCATION: LOCATION: LEG

## 2024-07-12 ASSESSMENT — PAIN DESCRIPTION - DESCRIPTORS: DESCRIPTORS: SHARP

## 2024-07-12 ASSESSMENT — PAIN DESCRIPTION - FREQUENCY: FREQUENCY: CONTINUOUS

## 2024-07-12 ASSESSMENT — PAIN DESCRIPTION - ORIENTATION: ORIENTATION: RIGHT

## 2024-07-12 ASSESSMENT — PAIN DESCRIPTION - PAIN TYPE: TYPE: ACUTE PAIN

## 2024-07-12 NOTE — PLAN OF CARE
Wound Care Supplies      Supply Company:     [employer] [address] [address] [facility] [address] [place] [address] p: [phone]     Ordering Center:     [facility]  [address]  [facility] [address]  P: [phone]  FAX: [phone]    Patient Information:      Ke Padron  [address] [place] [id number]   [phone]   : 1955  AGE: 68 y.o.     GENDER: male   TODAYS DATE:  2024    Insurance:      PRIMARY INSURANCE:  Plan: MEDICARE PART A AND B  Coverage: MEDICARE  Effective Date: 2024  [id number] - (Medicare)    SECONDARY INSURANCE:  Plan: [employer] - EXCHANGE  Coverage: MEDICAL MUTUAL  Effective Date: 2022  [unfilled]    [unfilled]   [unfilled]     Patient Wound Information:      1. Ulcer of right lower extremity, limited to breakdown of skin (HCC)    2. Lymphedema        WOUNDS REQUIRING DRESSING SUPPLIES:     Wound 24 Tibial Posterior;Lower;Right #1 (Active)   Wound Image   24 1024   Wound Etiology Venous 24 1024   Wound Cleansed Cleansed with saline 24 0902   Wound Length (cm) 1.6 cm 24 0902   Wound Width (cm) 1 cm 24 0902   Wound Depth (cm) 0.2 cm 24 0902   Wound Surface Area (cm^2) 1.6 cm^2 24 0902   Change in Wound Size % (l*w) -33.33 24 0902   Wound Volume (cm^3) 0.32 cm^3 24 0902   Wound Healing % -33 24 0902   Post-Procedure Length (cm) 1.7 cm 24 0931   Post-Procedure Width (cm) 1.1 cm 2431   Post-Procedure Depth (cm) 0.4 cm 2431   Post-Procedure Surface Area (cm^2) 1.87 cm^2 07/12/24 0931   Post-Procedure Volume (cm^3) 0.748 cm^3 24   Distance Tunneling (cm) 0 cm 24   Undermining Maxium Distance (cm) 0 24   Wound Assessment Pink/red;Slough 24   Drainage Amount Moderate (25-50%) 24   Drainage Description Serosanguinous 24

## 2024-07-12 NOTE — PROGRESS NOTES
bedtime.      cyclobenzaprine (FLEXERIL) 10 MG tablet Take 1 tablet by mouth 3 times daily as needed for Muscle spasms (Patient not taking: Reported on 7/12/2024)      albuterol sulfate HFA (PROVENTIL;VENTOLIN;PROAIR) 108 (90 Base) MCG/ACT inhaler Inhale 1 puff into the lungs every 4 hours as needed (Patient not taking: Reported on 7/12/2024)      insulin glargine (LANTUS;BASAGLAR) 100 UNIT/ML injection pen Inject 60 Units into the skin nightly 5 pen 1    lisinopril (PRINIVIL;ZESTRIL) 10 MG tablet Take 1 tablet by mouth daily 30 tablet 1    blood glucose monitor kit and supplies Dispense sufficient amount for indicated testing frequency plus additional to accommodate PRN testing needs. Dispense all needed supplies to include: monitor, strips, lancing device, lancets, control solutions, alcohol swabs. 1 kit 0    Insulin Pen Needle 32G X 4 MM MISC 1 each by Does not apply route daily 100 each 3    naproxen (EC-NAPROSYN) 500 MG EC tablet Take 1 tablet by mouth every 12 hours as needed for Pain 20 tablet 3    silver sulfADIAZINE (SILVADENE) 1 % cream Apply to skin on right leg (Patient not taking: Reported on 7/12/2024) 25 g 0    famotidine (PEPCID AC) 10 MG tablet Take 10 mg by mouth 2 times daily. (Patient not taking: Reported on 7/12/2024)      cetirizine-psuedoephedrine (ZYRTEC-D) 5-120 MG per tablet Take 1 tablet by mouth 2 times daily       No current facility-administered medications on file prior to encounter.       REVIEW OF SYSTEMS  Review of Systems    Pertinent items are noted in HPI.    Objective:      /87   Pulse 93   Temp 96.8 °F (36 °C) (Temporal)   Resp 18     Wt Readings from Last 3 Encounters:   07/05/24 (!) 158.2 kg (348 lb 12.3 oz)   05/17/21 (!) 168 kg (370 lb 6 oz)   09/16/20 (!) 189.6 kg (418 lb)       PHYSICAL EXAM    General Appearance: alert and oriented to person, place and time and obese  Extremities: no cyanosis, no clubbing, 2 + edema-  right lower extremity, and full-thickness

## 2024-07-12 NOTE — PATIENT INSTRUCTIONS
You may receive a survey from a company called [email or web address] asking for your feedback.  We would appreciate it if you took a few minutes to share your experience.  Your input is very valuable to us.

## 2024-07-19 ENCOUNTER — HOSPITAL ENCOUNTER (OUTPATIENT)
Dept: WOUND CARE | Age: 69
Discharge: HOME | End: 2024-07-19
Attending: SPECIALIST
Payer: MEDICARE

## 2024-07-19 VITALS
RESPIRATION RATE: 16 BRPM | TEMPERATURE: 96 F | DIASTOLIC BLOOD PRESSURE: 87 MMHG | HEART RATE: 106 BPM | SYSTOLIC BLOOD PRESSURE: 157 MMHG

## 2024-07-19 DIAGNOSIS — L97.911 ULCER OF RIGHT LOWER EXTREMITY, LIMITED TO BREAKDOWN OF SKIN (HCC): Primary | ICD-10-CM

## 2024-07-19 DIAGNOSIS — I89.0 LYMPHEDEMA: ICD-10-CM

## 2024-07-19 PROCEDURE — 11042 DBRDMT SUBQ TIS 1ST 20SQCM/<: CPT

## 2024-07-19 PROCEDURE — 6370000000 HC RX 637 (ALT 250 FOR IP): Performed by: SPECIALIST

## 2024-07-19 PROCEDURE — 11042 DBRDMT SUBQ TIS 1ST 20SQCM/<: CPT | Performed by: SPECIALIST

## 2024-07-19 RX ORDER — LIDOCAINE 40 MG/G
CREAM TOPICAL ONCE
OUTPATIENT
Start: 2024-07-19 | End: 2024-07-19

## 2024-07-19 RX ORDER — BETAMETHASONE DIPROPIONATE 0.5 MG/G
CREAM TOPICAL ONCE
OUTPATIENT
Start: 2024-07-19 | End: 2024-07-19

## 2024-07-19 RX ORDER — LIDOCAINE HYDROCHLORIDE 40 MG/ML
SOLUTION TOPICAL ONCE
OUTPATIENT
Start: 2024-07-19 | End: 2024-07-19

## 2024-07-19 RX ORDER — SODIUM CHLOR/HYPOCHLOROUS ACID 0.033 %
SOLUTION, IRRIGATION IRRIGATION ONCE
OUTPATIENT
Start: 2024-07-19 | End: 2024-07-19

## 2024-07-19 RX ORDER — TRIAMCINOLONE ACETONIDE 1 MG/G
OINTMENT TOPICAL ONCE
OUTPATIENT
Start: 2024-07-19 | End: 2024-07-19

## 2024-07-19 RX ORDER — LIDOCAINE HYDROCHLORIDE 40 MG/ML
SOLUTION TOPICAL ONCE
Status: COMPLETED | OUTPATIENT
Start: 2024-07-19 | End: 2024-07-19

## 2024-07-19 RX ORDER — CLOBETASOL PROPIONATE 0.5 MG/G
OINTMENT TOPICAL ONCE
OUTPATIENT
Start: 2024-07-19 | End: 2024-07-19

## 2024-07-19 RX ORDER — GENTAMICIN SULFATE 1 MG/G
OINTMENT TOPICAL ONCE
OUTPATIENT
Start: 2024-07-19 | End: 2024-07-19

## 2024-07-19 RX ORDER — LIDOCAINE HYDROCHLORIDE 20 MG/ML
JELLY TOPICAL ONCE
OUTPATIENT
Start: 2024-07-19 | End: 2024-07-19

## 2024-07-19 RX ORDER — IBUPROFEN 200 MG
TABLET ORAL ONCE
OUTPATIENT
Start: 2024-07-19 | End: 2024-07-19

## 2024-07-19 RX ORDER — BACITRACIN ZINC AND POLYMYXIN B SULFATE 500; 1000 [USP'U]/G; [USP'U]/G
OINTMENT TOPICAL ONCE
OUTPATIENT
Start: 2024-07-19 | End: 2024-07-19

## 2024-07-19 RX ORDER — LIDOCAINE 50 MG/G
OINTMENT TOPICAL ONCE
OUTPATIENT
Start: 2024-07-19 | End: 2024-07-19

## 2024-07-19 RX ORDER — GINSENG 100 MG
CAPSULE ORAL ONCE
OUTPATIENT
Start: 2024-07-19 | End: 2024-07-19

## 2024-07-19 RX ADMIN — LIDOCAINE HYDROCHLORIDE: 40 SOLUTION TOPICAL at 09:26

## 2024-07-19 ASSESSMENT — PAIN DESCRIPTION - DESCRIPTORS: DESCRIPTORS: ACHING;SHARP

## 2024-07-19 ASSESSMENT — PAIN DESCRIPTION - PAIN TYPE: TYPE: CHRONIC PAIN

## 2024-07-19 ASSESSMENT — PAIN DESCRIPTION - FREQUENCY: FREQUENCY: CONTINUOUS

## 2024-07-19 ASSESSMENT — PAIN DESCRIPTION - ORIENTATION: ORIENTATION: RIGHT;LEFT

## 2024-07-19 ASSESSMENT — PAIN DESCRIPTION - LOCATION: LOCATION: GENERALIZED

## 2024-07-19 ASSESSMENT — PAIN SCALES - GENERAL: PAINLEVEL_OUTOF10: 10

## 2024-07-19 NOTE — PATIENT INSTRUCTIONS
size F under velcro compression  Apply: n 30-40mmHG velcro Compression Stocking on the Left leg with sock piece  Apply every morning immediately when getting up. Remove every night before going to bed unless instructed otherwise     Elevate leg(s) above the level of the heart for 30 minutes 4-5 times a day and/or when sitting.  Avoid prolonged standing in one place.    [x] Lymphedema Therapy: 1 HOUR TWICE DAILY  Wear Lymphedema pumps twice a day at settings prescribed by your physician.   Avoid prolonged standing in one place.    Dietary:  Important dietary reminders:  1. Increase Protein intake (i.e. Lean meats, fish, eggs, legumes, and yogurt)  2. No added salt  3. If diabetic, follow a diabetic diet and check glucose prior to meals or as instructed by your physician.    Dietary Supplements(Take twice a day unless instructed otherwise):  [] Sandip  [] 30ml ProStat [] Ensure Complete [] Ensure Max/Premier [] Expedite [] Other:    Your nurse  is:  DELVIS     Electronically signed by Delvis Cheng RN on 7/19/2024 at 9:41 AM     Wound Care Center Information: Should you experience any significant changes in your wound(s) or have questions about your wound care, please contact the [facility] at [phone].   Hours of operation:  Mon:  8AM - 2PM  Tue: 11AM - 5PM  Wed: CLOSED  Thur: 8AM - 4:30PM  Fri:  8AM - 4:30PM  The office is closed on all major holidays.    Please give us 24-48 business hours to return your call.  These hours of operation are subject to change. If you need help with your wounds and cannot wait until we are available, contact your PCP or go to your preferred emergency room.     Call your doctor now or seek immediate medical care if:    You have symptoms of infection, such as:  Increased pain, swelling, warmth, or redness.  Red streaks leading from the area.  Pus draining from the area.  A fever.     Patient Experience    Thank you for trusting us with your care.

## 2024-07-19 NOTE — PROGRESS NOTES
[facility]  Patient Instructions and Physician Orders  [address]  Telephone: [phone] FAX [phone]    NAME:  Ke Padron  YOB: 1955  DATE: 7/19/2024       Return Appointment:  Return Appointment: With Alverto Mena MD  in  1 Week(s)  [] Return Appointment for a Wound Assessment with the nurse on:     No future appointments.      Orders Placed This Encounter   Procedures    Initiate Outpatient Wound Care Protocol       Home Care Company: NONE    Medically necessary services for evaluation and treatment:   []Skilled Nursing (using clean technique) []PT (Eval & Treat) []OT (Eval & Treat) []Social Work []Dietician []Other:      Wound care instructions:  If you smoke we ask that you refrain from smoking. Smoking inhibits wounds from healing.  When taking antibiotics take the entire prescription as ordered. Do not stop taking until medication is all gone unless otherwise instructed.   Exercise as tolerated.   Keep weight off wounds and reposition every 2 hours if applicable.  Do not get wounds wet in the bath or shower unless otherwise instructed by your physician. If your wound is on your foot or leg, you may purchase a cast bag/cast cover. This may be purchased at any pharmacy or online.  Wash hands with soap and water prior to and after every dressing change.    [x]Wash wounds with: Mild soap and water  [x]Alicia wound Topical Treatments: Do not apply lotions, creams, or ointments to the skin around the wound bed unless directed as followed:   Apply around the wound: Nothing         [x]Wound Location: RIGHT LOWER LEG    Apply Primary Dressing to wound: Hydrofiber with silver (ie. Opticell Ag, Aquacel Ag)  Secondary Dressing: Extra absorbant pad OR ABD PADS, KERLIX   Avoid contact of tape with skin if possible.  When to change Dressing:  3xweek- Monday, Wednesday, friday    [x] Edema Control:  velcro compression 30-40mmHG- on right leg- Spandagrip size F under velcro

## 2024-07-26 ENCOUNTER — HOSPITAL ENCOUNTER (OUTPATIENT)
Dept: WOUND CARE | Age: 69
Discharge: HOME | End: 2024-07-26
Attending: SPECIALIST
Payer: MEDICARE

## 2024-07-26 VITALS
HEART RATE: 79 BPM | DIASTOLIC BLOOD PRESSURE: 101 MMHG | SYSTOLIC BLOOD PRESSURE: 173 MMHG | TEMPERATURE: 97 F | RESPIRATION RATE: 16 BRPM

## 2024-07-26 DIAGNOSIS — I89.0 LYMPHEDEMA: ICD-10-CM

## 2024-07-26 DIAGNOSIS — L97.911 ULCER OF RIGHT LOWER EXTREMITY, LIMITED TO BREAKDOWN OF SKIN (HCC): Primary | ICD-10-CM

## 2024-07-26 PROCEDURE — 11042 DBRDMT SUBQ TIS 1ST 20SQCM/<: CPT

## 2024-07-26 PROCEDURE — 6370000000 HC RX 637 (ALT 250 FOR IP): Performed by: SPECIALIST

## 2024-07-26 RX ORDER — LIDOCAINE HYDROCHLORIDE 40 MG/ML
SOLUTION TOPICAL ONCE
Status: COMPLETED | OUTPATIENT
Start: 2024-07-26 | End: 2024-07-26

## 2024-07-26 RX ORDER — LIDOCAINE 50 MG/G
OINTMENT TOPICAL ONCE
OUTPATIENT
Start: 2024-07-26 | End: 2024-07-26

## 2024-07-26 RX ORDER — TRIAMCINOLONE ACETONIDE 1 MG/G
OINTMENT TOPICAL ONCE
OUTPATIENT
Start: 2024-07-26 | End: 2024-07-26

## 2024-07-26 RX ORDER — LIDOCAINE HYDROCHLORIDE 20 MG/ML
JELLY TOPICAL ONCE
OUTPATIENT
Start: 2024-07-26 | End: 2024-07-26

## 2024-07-26 RX ORDER — GINSENG 100 MG
CAPSULE ORAL ONCE
OUTPATIENT
Start: 2024-07-26 | End: 2024-07-26

## 2024-07-26 RX ORDER — GENTAMICIN SULFATE 1 MG/G
OINTMENT TOPICAL ONCE
OUTPATIENT
Start: 2024-07-26 | End: 2024-07-26

## 2024-07-26 RX ORDER — IBUPROFEN 200 MG
TABLET ORAL ONCE
OUTPATIENT
Start: 2024-07-26 | End: 2024-07-26

## 2024-07-26 RX ORDER — SODIUM CHLOR/HYPOCHLOROUS ACID 0.033 %
SOLUTION, IRRIGATION IRRIGATION ONCE
OUTPATIENT
Start: 2024-07-26 | End: 2024-07-26

## 2024-07-26 RX ORDER — BETAMETHASONE DIPROPIONATE 0.5 MG/G
CREAM TOPICAL ONCE
OUTPATIENT
Start: 2024-07-26 | End: 2024-07-26

## 2024-07-26 RX ORDER — LIDOCAINE 40 MG/G
CREAM TOPICAL ONCE
OUTPATIENT
Start: 2024-07-26 | End: 2024-07-26

## 2024-07-26 RX ORDER — BACITRACIN ZINC AND POLYMYXIN B SULFATE 500; 1000 [USP'U]/G; [USP'U]/G
OINTMENT TOPICAL ONCE
OUTPATIENT
Start: 2024-07-26 | End: 2024-07-26

## 2024-07-26 RX ORDER — LIDOCAINE HYDROCHLORIDE 40 MG/ML
SOLUTION TOPICAL ONCE
OUTPATIENT
Start: 2024-07-26 | End: 2024-07-26

## 2024-07-26 RX ORDER — CLOBETASOL PROPIONATE 0.5 MG/G
OINTMENT TOPICAL ONCE
OUTPATIENT
Start: 2024-07-26 | End: 2024-07-26

## 2024-07-26 RX ADMIN — LIDOCAINE HYDROCHLORIDE: 40 SOLUTION TOPICAL at 10:40

## 2024-07-26 NOTE — PATIENT INSTRUCTIONS
trusting us with your care.  You may receive a survey from a company called [employer] asking for your feedback.  We would appreciate it if you took a few minutes to share your experience.  Your input is very valuable to us.

## 2024-07-26 NOTE — PROGRESS NOTES
[address]  Telephone: [phone] FAX [phone]    NAME:  Ke Padron  YOB: 1955  DATE: 7/26/2024       Return Appointment:  Return Appointment: With Alverto Mena MD  in  2 Week(s)  [] Return Appointment for a Wound Assessment with the nurse on:     No future appointments.      Orders Placed This Encounter   Procedures    Initiate Outpatient Wound Care Protocol       Home Care Company: NONE    Medically necessary services for evaluation and treatment:   []Skilled Nursing (using clean technique) []PT (Eval & Treat) []OT (Eval & Treat) []Social Work []Dietician []Other:      Wound care instructions:  If you smoke we ask that you refrain from smoking. Smoking inhibits wounds from healing.  When taking antibiotics take the entire prescription as ordered. Do not stop taking until medication is all gone unless otherwise instructed.   Exercise as tolerated.   Keep weight off wounds and reposition every 2 hours if applicable.  Do not get wounds wet in the bath or shower unless otherwise instructed by your physician. If your wound is on your foot or leg, you may purchase a cast bag/cast cover. This may be purchased at any pharmacy or online.  Wash hands with soap and water prior to and after every dressing change.    [x]Wash wounds with: Mild soap and water  [x]Alicia wound Topical Treatments: Do not apply lotions, creams, or ointments to the skin around the wound bed unless directed as followed:   Apply around the wound: Nothing         [x]Wound Location: RIGHT LOWER LEG    Apply Primary Dressing to wound: Hydrofiber with silver (ie. Opticell Ag, Aquacel Ag)  Secondary Dressing: Extra absorbant pad OR ABD PADS, KERLIX   Avoid contact of tape with skin if possible.  When to change Dressing:  3xweek- Monday, Wednesday, friday    [x] Edema Control:  velcro compression 30-40mmHG- on right leg- Spandagrip size F under velcro compression  Apply: n 30-40mmHG velcro Compression Stocking on the

## 2024-08-06 ENCOUNTER — TELEPHONE (OUTPATIENT)
Dept: WOUND CARE | Age: 69
End: 2024-08-06

## 2024-08-08 ENCOUNTER — HOSPITAL ENCOUNTER (OUTPATIENT)
Dept: WOUND CARE | Age: 69
Discharge: HOME | End: 2024-08-08
Attending: SPECIALIST
Payer: MEDICARE

## 2024-08-08 VITALS
RESPIRATION RATE: 16 BRPM | TEMPERATURE: 97.3 F | HEART RATE: 92 BPM | SYSTOLIC BLOOD PRESSURE: 157 MMHG | DIASTOLIC BLOOD PRESSURE: 85 MMHG

## 2024-08-08 PROCEDURE — 11042 DBRDMT SUBQ TIS 1ST 20SQCM/<: CPT

## 2024-08-08 PROCEDURE — 11042 DBRDMT SUBQ TIS 1ST 20SQCM/<: CPT | Performed by: SPECIALIST

## 2024-08-08 ASSESSMENT — PAIN DESCRIPTION - DESCRIPTORS: DESCRIPTORS: ACHING

## 2024-08-08 ASSESSMENT — PAIN DESCRIPTION - LOCATION: LOCATION: LEG

## 2024-08-08 ASSESSMENT — PAIN SCALES - GENERAL: PAINLEVEL_OUTOF10: 10

## 2024-08-08 ASSESSMENT — PAIN DESCRIPTION - PAIN TYPE: TYPE: CHRONIC PAIN

## 2024-08-08 ASSESSMENT — PAIN DESCRIPTION - FREQUENCY: FREQUENCY: CONTINUOUS

## 2024-08-08 ASSESSMENT — PAIN DESCRIPTION - ORIENTATION: ORIENTATION: RIGHT;LEFT

## 2024-08-08 NOTE — PROGRESS NOTES
[facility]  Progress Note and Procedure Note      Ke Padron  MEDICAL RECORD NUMBER:  [id number]  AGE: 68 y.o.   GENDER: male  : 1955  EPISODE DATE:  2024    Subjective:     Chief Complaint   Patient presents with    Wound Check     BLE         HISTORY of PRESENT ILLNESS HPI     Ke Padron is a 68 y.o. male who presents today for wound/ulcer evaluation.   History of Wound Context: Patient referred today for longstanding history of lymphedema chronic venous insufficiency and ulceration. Is been seen in the past at other wound care centers dating back years. Although he does have lymphedema pumps at home he does not use them. In addition it sounds like patient has not been compliant wearing his compression stockings. He has described progressive swelling of both legs. And also small ulcerations right posterior calf. He is morbidly obese and diabetic with last recorded A1c being elevated. Since last visit patient has purchased new Velcro compression stockings.  He claims to be utilizing his newly purchased Velcro compression stockings as well as family member assisting him with dressing changes to the venous ulcers  Wound/Ulcer Pain Timing/Severity: none  Quality of pain: N/A  Severity:  0 / 10   Modifying Factors: None  Associated Signs/Symptoms: edema and drainage    Ulcer Identification:  Ulcer Type: venous    Contributing Factors: edema, venous stasis, lymphedema, diabetes, poor glucose control, decreased mobility, and obesity    Acute Wound: N/A not an acute wound    PAST MEDICAL HISTORY        Diagnosis Date    Asthma     Chronic pain     Diabetes mellitus (HCC)     GI bleed     Hypertension     Pain, knee, right        PAST SURGICAL HISTORY    Past Surgical History:   Procedure Laterality Date    KNEE SURGERY      right knee       FAMILY HISTORY    Family History   Problem Relation Age of Onset    Asthma Mother     High Cholesterol Mother     High Blood Pressure Father

## 2024-08-08 NOTE — PATIENT INSTRUCTIONS
compression  Apply: n 30-40mmHG velcro Compression Stocking on the Left leg with sock piece  Apply every morning immediately when getting up. Remove every night before going to bed unless instructed otherwise     Elevate leg(s) above the level of the heart for 30 minutes 4-5 times a day and/or when sitting.  Avoid prolonged standing in one place.    [x] Lymphedema Therapy: 1 HOUR TWICE DAILY- once patient finds them  Wear Lymphedema pumps twice a day at settings prescribed by your physician.   Avoid prolonged standing in one place.    Dietary:  Important dietary reminders:  1. Increase Protein intake (i.e. Lean meats, fish, eggs, legumes, and yogurt)  2. No added salt  3. If diabetic, follow a diabetic diet and check glucose prior to meals or as instructed by your physician.    Dietary Supplements(Take twice a day unless instructed otherwise):  [] Sandip  [] 30ml ProStat [] Ensure Complete [] Ensure Max/Premier [] Expedite [] Other:    Your nurse  is:  DELVIS     Electronically signed by Delvis Cheng RN on 8/8/2024 at 12:03 PM     Wound Care Center Information: Should you experience any significant changes in your wound(s) or have questions about your wound care, please contact the [facility] at [phone].   Hours of operation:  Mon:  8AM - 2PM  Tue: 11AM - 5PM  Wed: CLOSED  Thur: 8AM - 4:30PM  Fri:  8AM - 4:30PM  The office is closed on all major holidays.    Please give us 24-48 business hours to return your call.  These hours of operation are subject to change. If you need help with your wounds and cannot wait until we are available, contact your PCP or go to your preferred emergency room.     Call your doctor now or seek immediate medical care if:    You have symptoms of infection, such as:  Increased pain, swelling, warmth, or redness.  Red streaks leading from the area.  Pus draining from the area.  A fever.     Patient Experience    Thank you for trusting us with your

## 2024-08-08 NOTE — PLAN OF CARE
Wound Care Supplies      Supply Company:     [employer] [address] [facility] [place] [address] p: [phone] f [phone]     Ordering Center:     [facility]  [address]  [facility] [address]  P: [phone]  FAX: [phone]    Patient Information:      Ke Padron  [address] [place] [address]   [phone]   : 1955  AGE: 68 y.o.     GENDER: male   TODAYS DATE:  2024    Insurance:      PRIMARY INSURANCE:  Plan: MEDICARE PART A AND B  Coverage: MEDICARE  Effective Date: 2022  [id number] - (Medicare)    SECONDARY INSURANCE:  Plan: MEDICAL MUTUAL DUTCH - EXCHANGE  Coverage: MEDICAL MUTUAL  Effective Date: 2022  [unfilled]    [unfilled]   [unfilled]     Patient Wound Information:      ICD 10: I87.331    WOUNDS REQUIRING DRESSING SUPPLIES:     Wound 24 Tibial Posterior;Lower;Right #1 (Active)   Wound Image   24 1116   Wound Etiology Venous 24 1024   Wound Cleansed Cleansed with saline 24 1040   Dressing/Treatment Alginate with Ag 24 1116   Wound Length (cm) 0.5 cm 24 1116   Wound Width (cm) 0.5 cm 24 1116   Wound Depth (cm) 0.1 cm 24 1116   Wound Surface Area (cm^2) 0.25 cm^2 24 1116   Change in Wound Size % (l*w) 79.17 24 1116   Wound Volume (cm^3) 0.025 cm^3 24 1116   Wound Healing % 90 24 1116   Post-Procedure Length (cm) 0.6 cm 24 1205   Post-Procedure Width (cm) 0.6 cm 24 1205   Post-Procedure Depth (cm) 0.3 cm 24 1205   Post-Procedure Surface Area (cm^2) 0.36 cm^2 24 1205   Post-Procedure Volume (cm^3) 0.108 cm^3 24 1205   Distance Tunneling (cm) 0 cm 24 09   Undermining Maxium Distance (cm) 0 24 09   Wound Assessment Fibrin;Pink/red 24 1116   Drainage Amount Moderate (25-50%) 24 1116   Drainage Description Serosanguinous;Sanguinous 24 1116   Odor

## 2024-08-23 ENCOUNTER — HOSPITAL ENCOUNTER (OUTPATIENT)
Dept: WOUND CARE | Age: 69
Discharge: HOME | End: 2024-08-23
Attending: SPECIALIST
Payer: MEDICARE

## 2024-08-23 VITALS
HEART RATE: 99 BPM | TEMPERATURE: 97.1 F | DIASTOLIC BLOOD PRESSURE: 106 MMHG | RESPIRATION RATE: 16 BRPM | SYSTOLIC BLOOD PRESSURE: 170 MMHG

## 2024-08-23 DIAGNOSIS — I89.0 LYMPHEDEMA: ICD-10-CM

## 2024-08-23 DIAGNOSIS — L97.911 ULCER OF RIGHT LOWER EXTREMITY, LIMITED TO BREAKDOWN OF SKIN (HCC): Primary | ICD-10-CM

## 2024-08-23 PROCEDURE — 87070 CULTURE OTHR SPECIMN AEROBIC: CPT

## 2024-08-23 PROCEDURE — 29581 APPL MULTLAYER CMPRN SYS LEG: CPT

## 2024-08-23 PROCEDURE — 87077 CULTURE AEROBIC IDENTIFY: CPT

## 2024-08-23 PROCEDURE — 87205 SMEAR GRAM STAIN: CPT

## 2024-08-23 PROCEDURE — 11042 DBRDMT SUBQ TIS 1ST 20SQCM/<: CPT

## 2024-08-23 PROCEDURE — 87186 SC STD MICRODIL/AGAR DIL: CPT

## 2024-08-23 PROCEDURE — 6370000000 HC RX 637 (ALT 250 FOR IP): Performed by: SPECIALIST

## 2024-08-23 RX ORDER — LIDOCAINE HYDROCHLORIDE 20 MG/ML
JELLY TOPICAL ONCE
OUTPATIENT
Start: 2024-08-23 | End: 2024-08-23

## 2024-08-23 RX ORDER — LIDOCAINE HYDROCHLORIDE 40 MG/ML
SOLUTION TOPICAL ONCE
OUTPATIENT
Start: 2024-08-23 | End: 2024-08-23

## 2024-08-23 RX ORDER — BACITRACIN ZINC AND POLYMYXIN B SULFATE 500; 1000 [USP'U]/G; [USP'U]/G
OINTMENT TOPICAL ONCE
OUTPATIENT
Start: 2024-08-23 | End: 2024-08-23

## 2024-08-23 RX ORDER — LIDOCAINE 50 MG/G
OINTMENT TOPICAL ONCE
OUTPATIENT
Start: 2024-08-23 | End: 2024-08-23

## 2024-08-23 RX ORDER — SODIUM CHLOR/HYPOCHLOROUS ACID 0.033 %
SOLUTION, IRRIGATION IRRIGATION ONCE
OUTPATIENT
Start: 2024-08-23 | End: 2024-08-23

## 2024-08-23 RX ORDER — LIDOCAINE HYDROCHLORIDE 40 MG/ML
SOLUTION TOPICAL ONCE
Status: COMPLETED | OUTPATIENT
Start: 2024-08-23 | End: 2024-08-23

## 2024-08-23 RX ORDER — TRIAMCINOLONE ACETONIDE 1 MG/G
OINTMENT TOPICAL ONCE
OUTPATIENT
Start: 2024-08-23 | End: 2024-08-23

## 2024-08-23 RX ORDER — IBUPROFEN 200 MG
TABLET ORAL ONCE
OUTPATIENT
Start: 2024-08-23 | End: 2024-08-23

## 2024-08-23 RX ORDER — LIDOCAINE 40 MG/G
CREAM TOPICAL ONCE
OUTPATIENT
Start: 2024-08-23 | End: 2024-08-23

## 2024-08-23 RX ORDER — BETAMETHASONE DIPROPIONATE 0.5 MG/G
CREAM TOPICAL ONCE
OUTPATIENT
Start: 2024-08-23 | End: 2024-08-23

## 2024-08-23 RX ORDER — CLOBETASOL PROPIONATE 0.5 MG/G
OINTMENT TOPICAL ONCE
OUTPATIENT
Start: 2024-08-23 | End: 2024-08-23

## 2024-08-23 RX ORDER — GENTAMICIN SULFATE 1 MG/G
OINTMENT TOPICAL ONCE
OUTPATIENT
Start: 2024-08-23 | End: 2024-08-23

## 2024-08-23 RX ORDER — GINSENG 100 MG
CAPSULE ORAL ONCE
OUTPATIENT
Start: 2024-08-23 | End: 2024-08-23

## 2024-08-23 RX ADMIN — LIDOCAINE HYDROCHLORIDE: 40 SOLUTION TOPICAL at 08:25

## 2024-08-23 ASSESSMENT — PAIN DESCRIPTION - PAIN TYPE: TYPE: CHRONIC PAIN

## 2024-08-23 ASSESSMENT — PAIN DESCRIPTION - FREQUENCY: FREQUENCY: CONTINUOUS

## 2024-08-23 ASSESSMENT — PAIN DESCRIPTION - LOCATION: LOCATION: LEG

## 2024-08-23 ASSESSMENT — PAIN DESCRIPTION - DESCRIPTORS: DESCRIPTORS: ACHING

## 2024-08-23 ASSESSMENT — PAIN SCALES - GENERAL: PAINLEVEL_OUTOF10: 10

## 2024-08-23 ASSESSMENT — PAIN DESCRIPTION - ORIENTATION: ORIENTATION: RIGHT

## 2024-08-23 NOTE — PROGRESS NOTES
Multilayer Compression Wrap   (Not Unna) Below the Knee    NAME:  Ke Padron  YOB: 1955  MEDICAL RECORD NUMBER:  [id number]  DATE:  8/23/2024       Removed old Multilayer wrap if present and washed leg with mild soap/water.   Applied moisturizing agent to dry skin as needed.    Applied primary and secondary dressing as ordered    Applied multilayered dressing below the knee to Right lower leg(s)  (4 Layer Compression Wrap ) .    Instructed patient/caregiver not to remove dressing and to keep it clean and dry.    Instructed patient/caregiver on complications to report to provider, such as pain, numbness in toes, heavy drainage, and slippage of dressing.    Instructed patient on purpose of compression dressing and on activity and exercise recommendations.   Applied per   Guidelines    Electronically signed by Jenny Bernal RN on 8/23/2024 at 9:21 AM    
future appointments.      Orders Placed This Encounter   Procedures    Initiate Outpatient Wound Care Protocol       Home Care Company: none    Medically necessary services for evaluation and treatment:   []Skilled Nursing (using clean technique) []PT (Eval & Treat) []OT (Eval & Treat) []Social Work []Dietician []Other:      Wound care instructions:  If you smoke we ask that you refrain from smoking. Smoking inhibits wounds from healing.  When taking antibiotics take the entire prescription as ordered. Do not stop taking until medication is all gone unless otherwise instructed.   Exercise as tolerated.   Keep weight off wounds and reposition every 2 hours if applicable.  Do not get wounds wet in the bath or shower unless otherwise instructed by your physician. If your wound is on your foot or leg, you may purchase a cast bag/cast cover. This may be purchased at any pharmacy or online.  Wash hands with soap and water prior to and after every dressing change.    [x]Wash wounds with: No need to wash. Leave dressing in place.    [x]Alicia wound Topical Treatments: Do not apply lotions, creams, or ointments to the skin around the wound bed unless directed as followed:   Apply around the wound: Nothing         [x]Wound Location: right lower leg   Apply Primary Dressing to wound:  polymem with silver  Secondary Dressing: Extra absorbant pad   Avoid contact of tape with skin if possible.  When to change Dressing: DO NOT CHANGE    [x] Multilayer Compression Wrap:  Type: Applied on Right lower leg(s)  4 Layer Compression Wrap    Do not get leg(s) with wrap wet.  If wraps become too tight call the center or completely remove the wrap.   Elevate leg(s) above the level of the heart when sitting.  Avoid prolonged standing in one place.   Applied in Clinic on 8/23/2024  The Goal of this therapy is to reduce edema and get into long term compression garments to control venous insufficiency, lymphedema and reduce occurrence of venous

## 2024-08-23 NOTE — PATIENT INSTRUCTIONS
[facility]  Patient Instructions and Physician Orders  [address]  Telephone: [phone] FAX [phone]    NAME:  Ke Padron  YOB: 1955  DATE: 8/23/2024       Return Appointment:  Return Appointment: With Alverto Mena MD  in  1 Week(s)  [] Return Appointment for a Wound Assessment with the nurse on:     No future appointments.      Orders Placed This Encounter   Procedures    Initiate Outpatient Wound Care Protocol       Home Care Company: none    Medically necessary services for evaluation and treatment:   []Skilled Nursing (using clean technique) []PT (Eval & Treat) []OT (Eval & Treat) []Social Work []Dietician []Other:      Wound care instructions:  If you smoke we ask that you refrain from smoking. Smoking inhibits wounds from healing.  When taking antibiotics take the entire prescription as ordered. Do not stop taking until medication is all gone unless otherwise instructed.   Exercise as tolerated.   Keep weight off wounds and reposition every 2 hours if applicable.  Do not get wounds wet in the bath or shower unless otherwise instructed by your physician. If your wound is on your foot or leg, you may purchase a cast bag/cast cover. This may be purchased at any pharmacy or online.  Wash hands with soap and water prior to and after every dressing change.    [x]Wash wounds with: No need to wash. Leave dressing in place.    [x]Alicia wound Topical Treatments: Do not apply lotions, creams, or ointments to the skin around the wound bed unless directed as followed:   Apply around the wound: Nothing         [x]Wound Location: right lower leg   Apply Primary Dressing to wound:  polymem with silver  Secondary Dressing: Extra absorbant pad   Avoid contact of tape with skin if possible.  When to change Dressing: DO NOT CHANGE    [x] Multilayer Compression Wrap:  Type: Applied on Right lower leg(s)  4 Layer Compression Wrap    Do not get leg(s) with

## 2024-08-25 LAB
BACTERIA SPEC AEROBE CULT: ABNORMAL
GRAM STN SPEC: ABNORMAL
ORGANISM: ABNORMAL

## 2024-08-26 ENCOUNTER — TELEPHONE (OUTPATIENT)
Dept: WOUND CARE | Age: 69
End: 2024-08-26

## 2024-08-26 NOTE — TELEPHONE ENCOUNTER
Called patient wound culture result. Escribed cipro to pharmacy listed in chart. Patient instructed to take as directed. Patient states compression wrap still up and intact.

## 2024-08-29 ENCOUNTER — HOSPITAL ENCOUNTER (OUTPATIENT)
Dept: WOUND CARE | Age: 69
Discharge: HOME | End: 2024-08-29
Attending: SPECIALIST
Payer: MEDICARE

## 2024-08-29 VITALS
TEMPERATURE: 96.7 F | HEART RATE: 90 BPM | SYSTOLIC BLOOD PRESSURE: 165 MMHG | DIASTOLIC BLOOD PRESSURE: 79 MMHG | RESPIRATION RATE: 16 BRPM

## 2024-08-29 DIAGNOSIS — L97.911 ULCER OF RIGHT LOWER EXTREMITY, LIMITED TO BREAKDOWN OF SKIN (HCC): Primary | ICD-10-CM

## 2024-08-29 DIAGNOSIS — I89.0 LYMPHEDEMA: ICD-10-CM

## 2024-08-29 PROCEDURE — 11042 DBRDMT SUBQ TIS 1ST 20SQCM/<: CPT

## 2024-08-29 PROCEDURE — 11042 DBRDMT SUBQ TIS 1ST 20SQCM/<: CPT | Performed by: SPECIALIST

## 2024-08-29 PROCEDURE — 11045 DBRDMT SUBQ TISS EACH ADDL: CPT | Performed by: SPECIALIST

## 2024-08-29 PROCEDURE — 6370000000 HC RX 637 (ALT 250 FOR IP): Performed by: SPECIALIST

## 2024-08-29 PROCEDURE — 29581 APPL MULTLAYER CMPRN SYS LEG: CPT

## 2024-08-29 PROCEDURE — 11045 DBRDMT SUBQ TISS EACH ADDL: CPT

## 2024-08-29 RX ORDER — GINSENG 100 MG
CAPSULE ORAL ONCE
OUTPATIENT
Start: 2024-08-29 | End: 2024-08-29

## 2024-08-29 RX ORDER — NEOMYCIN/BACITRACIN/POLYMYXINB 3.5-400-5K
OINTMENT (GRAM) TOPICAL ONCE
OUTPATIENT
Start: 2024-08-29 | End: 2024-08-29

## 2024-08-29 RX ORDER — TRIAMCINOLONE ACETONIDE 1 MG/G
OINTMENT TOPICAL ONCE
OUTPATIENT
Start: 2024-08-29 | End: 2024-08-29

## 2024-08-29 RX ORDER — SILVER SULFADIAZINE 10 MG/G
CREAM TOPICAL ONCE
OUTPATIENT
Start: 2024-08-29 | End: 2024-08-29

## 2024-08-29 RX ORDER — GENTAMICIN SULFATE 1 MG/G
OINTMENT TOPICAL ONCE
OUTPATIENT
Start: 2024-08-29 | End: 2024-08-29

## 2024-08-29 RX ORDER — LIDOCAINE HYDROCHLORIDE 40 MG/ML
SOLUTION TOPICAL ONCE
Status: COMPLETED | OUTPATIENT
Start: 2024-08-29 | End: 2024-08-29

## 2024-08-29 RX ORDER — LIDOCAINE 40 MG/G
CREAM TOPICAL ONCE
OUTPATIENT
Start: 2024-08-29 | End: 2024-08-29

## 2024-08-29 RX ORDER — LIDOCAINE HYDROCHLORIDE 20 MG/ML
JELLY TOPICAL ONCE
OUTPATIENT
Start: 2024-08-29 | End: 2024-08-29

## 2024-08-29 RX ORDER — CLOBETASOL PROPIONATE 0.5 MG/G
OINTMENT TOPICAL ONCE
OUTPATIENT
Start: 2024-08-29 | End: 2024-08-29

## 2024-08-29 RX ORDER — BETAMETHASONE DIPROPIONATE 0.5 MG/G
CREAM TOPICAL ONCE
OUTPATIENT
Start: 2024-08-29 | End: 2024-08-29

## 2024-08-29 RX ORDER — LIDOCAINE 50 MG/G
OINTMENT TOPICAL ONCE
OUTPATIENT
Start: 2024-08-29 | End: 2024-08-29

## 2024-08-29 RX ORDER — SODIUM CHLOR/HYPOCHLOROUS ACID 0.033 %
SOLUTION, IRRIGATION IRRIGATION ONCE
OUTPATIENT
Start: 2024-08-29 | End: 2024-08-29

## 2024-08-29 RX ORDER — MUPIROCIN 20 MG/G
OINTMENT TOPICAL ONCE
OUTPATIENT
Start: 2024-08-29 | End: 2024-08-29

## 2024-08-29 RX ORDER — BACITRACIN ZINC AND POLYMYXIN B SULFATE 500; 1000 [USP'U]/G; [USP'U]/G
OINTMENT TOPICAL ONCE
OUTPATIENT
Start: 2024-08-29 | End: 2024-08-29

## 2024-08-29 RX ORDER — LIDOCAINE HYDROCHLORIDE 40 MG/ML
SOLUTION TOPICAL ONCE
OUTPATIENT
Start: 2024-08-29 | End: 2024-08-29

## 2024-08-29 RX ADMIN — LIDOCAINE HYDROCHLORIDE: 40 SOLUTION TOPICAL at 11:13

## 2024-08-29 NOTE — PROGRESS NOTES
Multilayer Compression Wrap   (Not Unna) Below the Knee    NAME:  Ke Padron  YOB: 1955  MEDICAL RECORD NUMBER:  [id number]  DATE:  8/29/2024       Removed old Multilayer wrap if present and washed leg with mild soap/water.   Applied moisturizing agent to dry skin as needed.    Applied primary and secondary dressing as ordered    Applied multilayered dressing below the knee to Right lower leg(s)  (4 Layer Compression Wrap ) .    Instructed patient/caregiver not to remove dressing and to keep it clean and dry.    Instructed patient/caregiver on complications to report to provider, such as pain, numbness in toes, heavy drainage, and slippage of dressing.    Instructed patient on purpose of compression dressing and on activity and exercise recommendations.   Applied per   Guidelines    Electronically signed by Jenny Bernal RN on 8/29/2024 at 11:45 AM

## 2024-08-29 NOTE — PROGRESS NOTES
Insurance Verification Request      Ordering Center:     [facility]  [facility] [address] [facility] [place] [facility]  Wound Center Phone Number: [phone]  Fax Number: [phone]    Facility NPI: [id number]  Facility Tax ID [id number]    Provider Information:      Provider's Name: Dr. Mena   Provider's NPI: Alverto Mena MD,  NPI: [id number]    Patient Information:     Ke Padron  [address] [place] [address]   [phone]   : 1955  AGE: 68 y.o.     GENDER: male   TODAYS DATE:  2024    Insurance:     PRIMARY INSURANCE:  Plan: MEDICARE PART A AND B  Coverage: MEDICARE  Effective Date: 2022  Group Number: [unfilled]  Subscriber Number: [id number] - (Medicare)    Payer/Plan Subscr  Sex Relation Sub. Ins. ID Effective Group Num   1. [facility]* KE PADRON 1955 Male Self [id number]                                   [address] [address]   2. [place]* KE PADRON 1955 Male Self [id number]                                    [address] [address]       Application/product Information:     Benefit Verification Request:    Reason for Request: New Wound    [employer] ([employer]) FAX# [phone]    Trunk/Arms/Legs [id number] (1st 25 sq cm)    TheraSkin    Has patient been treated with any other Skin Substitute on this Wound:     No      WOUND #: 2 and 3    Total Square CM: 21.9    Procedure setting: Hospital Outpatient Department    Is the Patient currently in a skilled nursing facility: No     Is the wound work related: No    Procedure date: 24    Patient Information:     Additional Dx Codes: I87.331      ICD-10-CM    1. Ulcer of right lower extremity, limited to breakdown of skin (HCC)  L97.911 Initiate Outpatient Wound Care Protocol     lidocaine (XYLOCAINE) 4 % external solution     Initiate Outpatient Wound Care Protocol      2. Lymphedema  I89.0 Initiate Outpatient Wound Care Protocol     lidocaine (XYLOCAINE) 4 % external solution

## 2024-08-29 NOTE — PROGRESS NOTES
[facility]  Progress Note and Procedure Note      Ke Padron  MEDICAL RECORD NUMBER:  [id number]  AGE: 68 y.o.   GENDER: male  : 1955  EPISODE DATE:  2024    Subjective:     Chief Complaint   Patient presents with    Wound Check     Right lower leg           HISTORY of PRESENT ILLNESS HPI     Ke Padron is a 68 y.o. male who presents today for wound/ulcer evaluation.   History of Wound Context: Patient continues follow-up for lymphedema and chronic venous insufficiency and ulceration. He does not use his lymphedema pumps. He does state that he wears his Velcro compression stocking.  He has now been on his antibiotics for approximately 72 hours.  In addition he tolerated a 4-layer compression wrap without difficulty  Wound/Ulcer Pain Timing/Severity: none  Quality of pain: N/A  Severity:  0 / 10   Modifying Factors: None  Associated Signs/Symptoms: edema    Ulcer Identification:  Ulcer Type: venous    Contributing Factors: edema, venous stasis, lymphedema, diabetes, poor glucose control, decreased mobility, and obesity    Acute Wound: N/A not an acute wound    PAST MEDICAL HISTORY        Diagnosis Date    Asthma     Chronic pain     Diabetes mellitus (HCC)     GI bleed     Hypertension     Pain, knee, right        PAST SURGICAL HISTORY    Past Surgical History:   Procedure Laterality Date    KNEE SURGERY      right knee       FAMILY HISTORY    Family History   Problem Relation Age of Onset    Asthma Mother     High Cholesterol Mother     High Blood Pressure Father     High Cholesterol Father        SOCIAL HISTORY    Social History     Tobacco Use    Smoking status: Former     Current packs/day: 0.00     Types: Cigarettes     Quit date: 2009     Years since quittin.7    Smokeless tobacco: Never   Vaping Use    Vaping status: Never Used   Substance Use Topics    Alcohol use: Yes     Comment: occassionally     Drug use: Yes     Types: Marijuana (Weed)     Comment:  MEDICAL       ALLERGIES    Allergies   Allergen Reactions    Naproxen Other (See Comments)     GI Bleed    Upset stomach    Omeprazole        MEDICATIONS    Current Outpatient Medications on File Prior to Encounter   Medication Sig Dispense Refill    ciprofloxacin (CIPRO) 500 MG tablet Take 1 tablet by mouth 2 times daily for 10 days 20 tablet 0    famotidine (PEPCID) 20 MG tablet Take 1 tablet by mouth 2 times daily      salmeterol (SEREVENT DISKUS) 50 MCG/ACT AEPB diskus inhaler Inhale 1 puff into the lungs 2 times daily      pravastatin (PRAVACHOL) 40 MG tablet Take 1 tablet by mouth daily      potassium chloride (KLOR-CON M) 20 MEQ extended release tablet Take 1 tablet by mouth daily      levothyroxine (SYNTHROID) 75 MCG tablet Take 1 tablet by mouth Daily      ibuprofen (ADVIL;MOTRIN) 600 MG tablet Take 1 tablet by mouth every 6 hours as needed for Pain      furosemide (LASIX) 40 MG tablet Take 1 tablet by mouth Every Day      fluticasone (FLOVENT HFA) 220 MCG/ACT inhaler Inhale 1 puff into the lungs 2 times daily      fluticasone (FLONASE) 50 MCG/ACT nasal spray 1 spray by Nasal route daily      dulaglutide (TRULICITY) 1.5 MG/0.5ML SC injection Inject 0.5 mLs into the skin once a week      doxazosin (CARDURA) 2 MG tablet Take 1 tablet by mouth nightly at bedtime.      insulin glargine (LANTUS;BASAGLAR) 100 UNIT/ML injection pen Inject 60 Units into the skin nightly 5 pen 1    cetirizine-psuedoephedrine (ZYRTEC-D) 5-120 MG per tablet Take 1 tablet by mouth 2 times daily      sildenafil (VIAGRA) 100 MG tablet Take 1 tablet by mouth daily as needed for Erectile Dysfunction (Patient not taking: Reported on 7/12/2024)      naloxone 4 MG/0.1ML LIQD nasal spray 1 spray by Nasal route as needed (Patient not taking: Reported on 7/12/2024)      lisinopril-hydroCHLOROthiazide (PRINZIDE;ZESTORETIC) 20-25 MG per tablet Take 2 tablets by mouth daily      HYDROcodone-acetaminophen (NORCO)  MG per tablet Take 1 tablet

## 2024-08-29 NOTE — PATIENT INSTRUCTIONS
[facility]  Patient Instructions and Physician Orders  [address] [place]  Telephone: [phone] FAX [phone]    NAME:  Ke Padron  YOB: 1955  DATE: 8/29/2024       Return Appointment:  Return Appointment: With Alverto Mena MD  in  1 Week(s)  [] Return Appointment for a Wound Assessment with the nurse on:     No future appointments.      Orders Placed This Encounter   Procedures    Initiate Outpatient Wound Care Protocol       Home Care Company: none    Medically necessary services for evaluation and treatment:   []Skilled Nursing (using clean technique) []PT (Eval & Treat) []OT (Eval & Treat) []Social Work []Dietician []Other:      Wound care instructions:  If you smoke we ask that you refrain from smoking. Smoking inhibits wounds from healing.  When taking antibiotics take the entire prescription as ordered. Do not stop taking until medication is all gone unless otherwise instructed.   Exercise as tolerated.   Keep weight off wounds and reposition every 2 hours if applicable.  Do not get wounds wet in the bath or shower unless otherwise instructed by your physician. If your wound is on your foot or leg, you may purchase a cast bag/cast cover. This may be purchased at any pharmacy or online.  Wash hands with soap and water prior to and after every dressing change.    [x]Wash wounds with: No need to wash. Leave dressing in place.    [x]Alicia wound Topical Treatments: Do not apply lotions, creams, or ointments to the skin around the wound bed unless directed as followed:   Apply around the wound: Nothing         [x]Wound Location: right lower leg   Apply Primary Dressing to wound:  polymem with silver  Secondary Dressing: Extra absorbant pad   Avoid contact of tape with skin if possible.  When to change Dressing: DO NOT CHANGE    [x] Multilayer Compression Wrap:  Type: Applied on Right lower leg(s)  4 Layer Compression Wrap    Do not get leg(s) with

## 2024-08-30 ENCOUNTER — APPOINTMENT (OUTPATIENT)
Dept: WOUND CARE | Age: 69
End: 2024-08-30
Attending: SPECIALIST
Payer: MEDICARE

## 2024-09-06 ENCOUNTER — HOSPITAL ENCOUNTER (OUTPATIENT)
Dept: WOUND CARE | Age: 69
Discharge: HOME | End: 2024-09-06
Attending: SPECIALIST
Payer: MEDICARE

## 2024-09-06 DIAGNOSIS — I89.0 LYMPHEDEMA: ICD-10-CM

## 2024-09-06 DIAGNOSIS — L97.911 ULCER OF RIGHT LOWER EXTREMITY, LIMITED TO BREAKDOWN OF SKIN (HCC): Primary | ICD-10-CM

## 2024-09-06 PROCEDURE — 11042 DBRDMT SUBQ TIS 1ST 20SQCM/<: CPT

## 2024-09-06 PROCEDURE — 6370000000 HC RX 637 (ALT 250 FOR IP): Performed by: SPECIALIST

## 2024-09-06 PROCEDURE — 29581 APPL MULTLAYER CMPRN SYS LEG: CPT

## 2024-09-06 PROCEDURE — 11045 DBRDMT SUBQ TISS EACH ADDL: CPT

## 2024-09-06 RX ORDER — LIDOCAINE HYDROCHLORIDE 20 MG/ML
JELLY TOPICAL ONCE
OUTPATIENT
Start: 2024-09-06 | End: 2024-09-06

## 2024-09-06 RX ORDER — BACITRACIN ZINC AND POLYMYXIN B SULFATE 500; 1000 [USP'U]/G; [USP'U]/G
OINTMENT TOPICAL ONCE
OUTPATIENT
Start: 2024-09-06 | End: 2024-09-06

## 2024-09-06 RX ORDER — NEOMYCIN/BACITRACIN/POLYMYXINB 3.5-400-5K
OINTMENT (GRAM) TOPICAL ONCE
OUTPATIENT
Start: 2024-09-06 | End: 2024-09-06

## 2024-09-06 RX ORDER — CLOBETASOL PROPIONATE 0.5 MG/G
OINTMENT TOPICAL ONCE
OUTPATIENT
Start: 2024-09-06 | End: 2024-09-06

## 2024-09-06 RX ORDER — MUPIROCIN 20 MG/G
OINTMENT TOPICAL ONCE
OUTPATIENT
Start: 2024-09-06 | End: 2024-09-06

## 2024-09-06 RX ORDER — LIDOCAINE 40 MG/G
CREAM TOPICAL ONCE
OUTPATIENT
Start: 2024-09-06 | End: 2024-09-06

## 2024-09-06 RX ORDER — GINSENG 100 MG
CAPSULE ORAL ONCE
OUTPATIENT
Start: 2024-09-06 | End: 2024-09-06

## 2024-09-06 RX ORDER — SODIUM CHLOR/HYPOCHLOROUS ACID 0.033 %
SOLUTION, IRRIGATION IRRIGATION ONCE
OUTPATIENT
Start: 2024-09-06 | End: 2024-09-06

## 2024-09-06 RX ORDER — GENTAMICIN SULFATE 1 MG/G
OINTMENT TOPICAL ONCE
OUTPATIENT
Start: 2024-09-06 | End: 2024-09-06

## 2024-09-06 RX ORDER — SILVER SULFADIAZINE 10 MG/G
CREAM TOPICAL ONCE
OUTPATIENT
Start: 2024-09-06 | End: 2024-09-06

## 2024-09-06 RX ORDER — TRIAMCINOLONE ACETONIDE 1 MG/G
OINTMENT TOPICAL ONCE
OUTPATIENT
Start: 2024-09-06 | End: 2024-09-06

## 2024-09-06 RX ORDER — LIDOCAINE HYDROCHLORIDE 40 MG/ML
SOLUTION TOPICAL ONCE
OUTPATIENT
Start: 2024-09-06 | End: 2024-09-06

## 2024-09-06 RX ORDER — LIDOCAINE 50 MG/G
OINTMENT TOPICAL ONCE
OUTPATIENT
Start: 2024-09-06 | End: 2024-09-06

## 2024-09-06 RX ORDER — BETAMETHASONE DIPROPIONATE 0.5 MG/G
CREAM TOPICAL ONCE
OUTPATIENT
Start: 2024-09-06 | End: 2024-09-06

## 2024-09-06 RX ORDER — LIDOCAINE HYDROCHLORIDE 40 MG/ML
SOLUTION TOPICAL ONCE
Status: COMPLETED | OUTPATIENT
Start: 2024-09-06 | End: 2024-09-06

## 2024-09-06 RX ADMIN — LIDOCAINE HYDROCHLORIDE: 40 SOLUTION TOPICAL at 12:01

## 2024-09-06 NOTE — PROGRESS NOTES
Multilayer Compression Wrap   (Not Unna) Below the Knee    NAME:  Ke Padron  YOB: 1955  MEDICAL RECORD NUMBER:  [id number]  DATE:  9/6/2024       Removed old Multilayer wrap if present and washed leg with mild soap/water.   Applied moisturizing agent to dry skin as needed.    Applied primary and secondary dressing as ordered    Applied multilayered dressing below the knee to Right lower leg(s)  (4 Layer Compression Wrap ) .    Instructed patient/caregiver not to remove dressing and to keep it clean and dry.    Instructed patient/caregiver on complications to report to provider, such as pain, numbness in toes, heavy drainage, and slippage of dressing.    Instructed patient on purpose of compression dressing and on activity and exercise recommendations.   Applied per   Guidelines    Electronically signed by Eun Kapadia RN on 9/6/2024 at 12:33 PM      
care, please contact the [facility] at [phone].   Hours of operation:  Mon:  8AM - 2PM  Tue: 11AM - 5PM  Wed: CLOSED  Thur: 8AM - 4:30PM  Fri:  8AM - 4:30PM  The office is closed on all major holidays.    Please give us 24-48 business hours to return your call.  These hours of operation are subject to change. If you need help with your wounds and cannot wait until we are available, contact your PCP or go to your preferred emergency room.     Call your doctor now or seek immediate medical care if:    You have symptoms of infection, such as:  Increased pain, swelling, warmth, or redness.  Red streaks leading from the area.  Pus draining from the area.  A fever.     Patient Experience    Thank you for trusting us with your care.  You may receive a survey from a company called [employer] asking for your feedback.  We would appreciate it if you took a few minutes to share your experience.  Your input is very valuable to us.    Electronically signed by SUSAN SUAZO MD on 9/6/2024 at 1:45 PM

## 2024-09-06 NOTE — PATIENT INSTRUCTIONS
your PCP or go to your preferred emergency room.     Call your doctor now or seek immediate medical care if:    You have symptoms of infection, such as:  Increased pain, swelling, warmth, or redness.  Red streaks leading from the area.  Pus draining from the area.  A fever.     Patient Experience    Thank you for trusting us with your care.  You may receive a survey from a company called [employer] asking for your feedback.  We would appreciate it if you took a few minutes to share your experience.  Your input is very valuable to us.

## 2024-09-13 ENCOUNTER — HOSPITAL ENCOUNTER (OUTPATIENT)
Dept: WOUND CARE | Age: 69
Discharge: HOME | End: 2024-09-13
Attending: SPECIALIST
Payer: MEDICARE

## 2024-09-13 DIAGNOSIS — L97.911 ULCER OF RIGHT LOWER EXTREMITY, LIMITED TO BREAKDOWN OF SKIN (HCC): Primary | ICD-10-CM

## 2024-09-13 DIAGNOSIS — I89.0 LYMPHEDEMA: ICD-10-CM

## 2024-09-13 PROCEDURE — 11042 DBRDMT SUBQ TIS 1ST 20SQCM/<: CPT

## 2024-09-13 PROCEDURE — 11042 DBRDMT SUBQ TIS 1ST 20SQCM/<: CPT | Performed by: SPECIALIST

## 2024-09-13 PROCEDURE — 29581 APPL MULTLAYER CMPRN SYS LEG: CPT

## 2024-09-13 RX ORDER — LIDOCAINE 40 MG/G
CREAM TOPICAL ONCE
OUTPATIENT
Start: 2024-09-13 | End: 2024-09-13

## 2024-09-13 RX ORDER — SODIUM CHLOR/HYPOCHLOROUS ACID 0.033 %
SOLUTION, IRRIGATION IRRIGATION ONCE
OUTPATIENT
Start: 2024-09-13 | End: 2024-09-13

## 2024-09-13 RX ORDER — LIDOCAINE 50 MG/G
OINTMENT TOPICAL ONCE
OUTPATIENT
Start: 2024-09-13 | End: 2024-09-13

## 2024-09-13 RX ORDER — GENTAMICIN SULFATE 1 MG/G
OINTMENT TOPICAL ONCE
OUTPATIENT
Start: 2024-09-13 | End: 2024-09-13

## 2024-09-13 RX ORDER — CLOBETASOL PROPIONATE 0.5 MG/G
OINTMENT TOPICAL ONCE
OUTPATIENT
Start: 2024-09-13 | End: 2024-09-13

## 2024-09-13 RX ORDER — TRIAMCINOLONE ACETONIDE 1 MG/G
OINTMENT TOPICAL ONCE
OUTPATIENT
Start: 2024-09-13 | End: 2024-09-13

## 2024-09-13 RX ORDER — LIDOCAINE HYDROCHLORIDE 40 MG/ML
SOLUTION TOPICAL ONCE
OUTPATIENT
Start: 2024-09-13 | End: 2024-09-13

## 2024-09-13 RX ORDER — GINSENG 100 MG
CAPSULE ORAL ONCE
OUTPATIENT
Start: 2024-09-13 | End: 2024-09-13

## 2024-09-13 RX ORDER — LIDOCAINE 40 MG/G
CREAM TOPICAL ONCE
Status: DISCONTINUED | OUTPATIENT
Start: 2024-09-13 | End: 2024-09-14 | Stop reason: HOSPADM

## 2024-09-13 RX ORDER — NEOMYCIN/BACITRACIN/POLYMYXINB 3.5-400-5K
OINTMENT (GRAM) TOPICAL ONCE
OUTPATIENT
Start: 2024-09-13 | End: 2024-09-13

## 2024-09-13 RX ORDER — MUPIROCIN 20 MG/G
OINTMENT TOPICAL ONCE
OUTPATIENT
Start: 2024-09-13 | End: 2024-09-13

## 2024-09-13 RX ORDER — BETAMETHASONE DIPROPIONATE 0.5 MG/G
CREAM TOPICAL ONCE
OUTPATIENT
Start: 2024-09-13 | End: 2024-09-13

## 2024-09-13 RX ORDER — LIDOCAINE HYDROCHLORIDE 20 MG/ML
JELLY TOPICAL ONCE
OUTPATIENT
Start: 2024-09-13 | End: 2024-09-13

## 2024-09-13 RX ORDER — SILVER SULFADIAZINE 10 MG/G
CREAM TOPICAL ONCE
OUTPATIENT
Start: 2024-09-13 | End: 2024-09-13

## 2024-09-13 RX ORDER — BACITRACIN ZINC AND POLYMYXIN B SULFATE 500; 1000 [USP'U]/G; [USP'U]/G
OINTMENT TOPICAL ONCE
OUTPATIENT
Start: 2024-09-13 | End: 2024-09-13

## 2024-09-20 ENCOUNTER — HOSPITAL ENCOUNTER (OUTPATIENT)
Dept: WOUND CARE | Age: 69
Discharge: HOME | End: 2024-09-20
Attending: SPECIALIST
Payer: MEDICARE

## 2024-09-20 VITALS
DIASTOLIC BLOOD PRESSURE: 103 MMHG | TEMPERATURE: 98.9 F | HEART RATE: 89 BPM | RESPIRATION RATE: 16 BRPM | SYSTOLIC BLOOD PRESSURE: 194 MMHG

## 2024-09-20 DIAGNOSIS — L97.911 ULCER OF RIGHT LOWER EXTREMITY, LIMITED TO BREAKDOWN OF SKIN (HCC): Primary | ICD-10-CM

## 2024-09-20 DIAGNOSIS — I89.0 LYMPHEDEMA: ICD-10-CM

## 2024-09-20 PROCEDURE — 29581 APPL MULTLAYER CMPRN SYS LEG: CPT

## 2024-09-20 PROCEDURE — 11042 DBRDMT SUBQ TIS 1ST 20SQCM/<: CPT

## 2024-09-20 PROCEDURE — 6370000000 HC RX 637 (ALT 250 FOR IP): Performed by: SPECIALIST

## 2024-09-20 RX ORDER — SILVER SULFADIAZINE 10 MG/G
CREAM TOPICAL ONCE
OUTPATIENT
Start: 2024-09-20 | End: 2024-09-20

## 2024-09-20 RX ORDER — MUPIROCIN 20 MG/G
OINTMENT TOPICAL ONCE
OUTPATIENT
Start: 2024-09-20 | End: 2024-09-20

## 2024-09-20 RX ORDER — GINSENG 100 MG
CAPSULE ORAL ONCE
OUTPATIENT
Start: 2024-09-20 | End: 2024-09-20

## 2024-09-20 RX ORDER — LIDOCAINE HYDROCHLORIDE 20 MG/ML
JELLY TOPICAL ONCE
OUTPATIENT
Start: 2024-09-20 | End: 2024-09-20

## 2024-09-20 RX ORDER — LIDOCAINE HYDROCHLORIDE 40 MG/ML
SOLUTION TOPICAL ONCE
OUTPATIENT
Start: 2024-09-20 | End: 2024-09-20

## 2024-09-20 RX ORDER — GENTAMICIN SULFATE 1 MG/G
OINTMENT TOPICAL ONCE
OUTPATIENT
Start: 2024-09-20 | End: 2024-09-20

## 2024-09-20 RX ORDER — LIDOCAINE HYDROCHLORIDE 40 MG/ML
SOLUTION TOPICAL ONCE
Status: COMPLETED | OUTPATIENT
Start: 2024-09-20 | End: 2024-09-20

## 2024-09-20 RX ORDER — TRIAMCINOLONE ACETONIDE 1 MG/G
OINTMENT TOPICAL ONCE
OUTPATIENT
Start: 2024-09-20 | End: 2024-09-20

## 2024-09-20 RX ORDER — SODIUM CHLOR/HYPOCHLOROUS ACID 0.033 %
SOLUTION, IRRIGATION IRRIGATION ONCE
OUTPATIENT
Start: 2024-09-20 | End: 2024-09-20

## 2024-09-20 RX ORDER — CLOBETASOL PROPIONATE 0.5 MG/G
OINTMENT TOPICAL ONCE
OUTPATIENT
Start: 2024-09-20 | End: 2024-09-20

## 2024-09-20 RX ORDER — LIDOCAINE 40 MG/G
CREAM TOPICAL ONCE
OUTPATIENT
Start: 2024-09-20 | End: 2024-09-20

## 2024-09-20 RX ORDER — LIDOCAINE 50 MG/G
OINTMENT TOPICAL ONCE
OUTPATIENT
Start: 2024-09-20 | End: 2024-09-20

## 2024-09-20 RX ORDER — BACITRACIN ZINC AND POLYMYXIN B SULFATE 500; 1000 [USP'U]/G; [USP'U]/G
OINTMENT TOPICAL ONCE
OUTPATIENT
Start: 2024-09-20 | End: 2024-09-20

## 2024-09-20 RX ORDER — NEOMYCIN/BACITRACIN/POLYMYXINB 3.5-400-5K
OINTMENT (GRAM) TOPICAL ONCE
OUTPATIENT
Start: 2024-09-20 | End: 2024-09-20

## 2024-09-20 RX ORDER — BETAMETHASONE DIPROPIONATE 0.5 MG/G
CREAM TOPICAL ONCE
OUTPATIENT
Start: 2024-09-20 | End: 2024-09-20

## 2024-09-20 RX ADMIN — LIDOCAINE HYDROCHLORIDE: 40 SOLUTION TOPICAL at 12:07

## 2024-09-27 ENCOUNTER — HOSPITAL ENCOUNTER (OUTPATIENT)
Dept: WOUND CARE | Age: 69
Discharge: HOME | End: 2024-09-27
Attending: SPECIALIST
Payer: MEDICARE

## 2024-09-27 VITALS
SYSTOLIC BLOOD PRESSURE: 156 MMHG | HEIGHT: 67 IN | DIASTOLIC BLOOD PRESSURE: 106 MMHG | BODY MASS INDEX: 49.44 KG/M2 | WEIGHT: 315 LBS | RESPIRATION RATE: 20 BRPM | HEART RATE: 96 BPM | TEMPERATURE: 97.8 F

## 2024-09-27 DIAGNOSIS — L97.911 ULCER OF RIGHT LOWER EXTREMITY, LIMITED TO BREAKDOWN OF SKIN (HCC): Primary | ICD-10-CM

## 2024-09-27 DIAGNOSIS — I89.0 LYMPHEDEMA: ICD-10-CM

## 2024-09-27 PROCEDURE — 99211 OFF/OP EST MAY X REQ PHY/QHP: CPT

## 2024-09-27 PROCEDURE — 99212 OFFICE O/P EST SF 10 MIN: CPT | Performed by: SPECIALIST

## 2024-09-27 RX ORDER — LIDOCAINE 40 MG/G
CREAM TOPICAL ONCE
Status: CANCELLED | OUTPATIENT
Start: 2024-09-27 | End: 2024-09-27

## 2024-09-27 RX ORDER — GENTAMICIN SULFATE 1 MG/G
OINTMENT TOPICAL ONCE
Status: CANCELLED | OUTPATIENT
Start: 2024-09-27 | End: 2024-09-27

## 2024-09-27 RX ORDER — LIDOCAINE HYDROCHLORIDE 20 MG/ML
JELLY TOPICAL ONCE
Status: CANCELLED | OUTPATIENT
Start: 2024-09-27 | End: 2024-09-27

## 2024-09-27 RX ORDER — LIDOCAINE 50 MG/G
OINTMENT TOPICAL ONCE
Status: CANCELLED | OUTPATIENT
Start: 2024-09-27 | End: 2024-09-27

## 2024-09-27 RX ORDER — CLOBETASOL PROPIONATE 0.5 MG/G
OINTMENT TOPICAL ONCE
Status: CANCELLED | OUTPATIENT
Start: 2024-09-27 | End: 2024-09-27

## 2024-09-27 RX ORDER — BACITRACIN ZINC AND POLYMYXIN B SULFATE 500; 1000 [USP'U]/G; [USP'U]/G
OINTMENT TOPICAL ONCE
Status: CANCELLED | OUTPATIENT
Start: 2024-09-27 | End: 2024-09-27

## 2024-09-27 RX ORDER — NEOMYCIN/BACITRACIN/POLYMYXINB 3.5-400-5K
OINTMENT (GRAM) TOPICAL ONCE
Status: CANCELLED | OUTPATIENT
Start: 2024-09-27 | End: 2024-09-27

## 2024-09-27 RX ORDER — SILVER SULFADIAZINE 10 MG/G
CREAM TOPICAL ONCE
Status: CANCELLED | OUTPATIENT
Start: 2024-09-27 | End: 2024-09-27

## 2024-09-27 RX ORDER — BETAMETHASONE DIPROPIONATE 0.5 MG/G
CREAM TOPICAL ONCE
Status: CANCELLED | OUTPATIENT
Start: 2024-09-27 | End: 2024-09-27

## 2024-09-27 RX ORDER — LIDOCAINE 40 MG/G
CREAM TOPICAL ONCE
Status: DISCONTINUED | OUTPATIENT
Start: 2024-09-27 | End: 2024-09-28 | Stop reason: HOSPADM

## 2024-09-27 RX ORDER — GINSENG 100 MG
CAPSULE ORAL ONCE
Status: CANCELLED | OUTPATIENT
Start: 2024-09-27 | End: 2024-09-27

## 2024-09-27 RX ORDER — TRIAMCINOLONE ACETONIDE 1 MG/G
OINTMENT TOPICAL ONCE
Status: CANCELLED | OUTPATIENT
Start: 2024-09-27 | End: 2024-09-27

## 2024-09-27 RX ORDER — LIDOCAINE HYDROCHLORIDE 40 MG/ML
SOLUTION TOPICAL ONCE
Status: CANCELLED | OUTPATIENT
Start: 2024-09-27 | End: 2024-09-27

## 2024-09-27 RX ORDER — SODIUM CHLOR/HYPOCHLOROUS ACID 0.033 %
SOLUTION, IRRIGATION IRRIGATION ONCE
Status: CANCELLED | OUTPATIENT
Start: 2024-09-27 | End: 2024-09-27

## 2024-09-27 RX ORDER — MUPIROCIN 20 MG/G
OINTMENT TOPICAL ONCE
Status: CANCELLED | OUTPATIENT
Start: 2024-09-27 | End: 2024-09-27